# Patient Record
Sex: FEMALE | Race: WHITE | NOT HISPANIC OR LATINO | Employment: FULL TIME | URBAN - NONMETROPOLITAN AREA
[De-identification: names, ages, dates, MRNs, and addresses within clinical notes are randomized per-mention and may not be internally consistent; named-entity substitution may affect disease eponyms.]

---
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
## 2018-01-11 ENCOUNTER — OFFICE VISIT (OUTPATIENT)
Dept: OBSTETRICS AND GYNECOLOGY | Facility: CLINIC | Age: 18
End: 2018-01-11

## 2018-01-11 VITALS
BODY MASS INDEX: 42.81 KG/M2 | DIASTOLIC BLOOD PRESSURE: 62 MMHG | SYSTOLIC BLOOD PRESSURE: 104 MMHG | HEIGHT: 55 IN | WEIGHT: 185 LBS

## 2018-01-11 DIAGNOSIS — E28.2 PCOS (POLYCYSTIC OVARIAN SYNDROME): ICD-10-CM

## 2018-01-11 DIAGNOSIS — Z01.419 GYNECOLOGIC EXAM NORMAL: Primary | ICD-10-CM

## 2018-01-11 PROCEDURE — 99395 PREV VISIT EST AGE 18-39: CPT | Performed by: NURSE PRACTITIONER

## 2018-01-11 RX ORDER — LEVONORGESTREL AND ETHINYL ESTRADIOL 0.15-0.03
1 KIT ORAL DAILY
Qty: 28 TABLET | Refills: 12 | Status: SHIPPED | OUTPATIENT
Start: 2018-01-11 | End: 2019-01-11

## 2018-01-11 RX ORDER — METFORMIN HYDROCHLORIDE 500 MG/1
500 TABLET, EXTENDED RELEASE ORAL
Qty: 60 TABLET | Refills: 6 | Status: ON HOLD | OUTPATIENT
Start: 2018-01-11 | End: 2022-01-01

## 2018-01-11 NOTE — PATIENT INSTRUCTIONS
Pt. Counseled today on safe sex practices, pap smear performed, self breast examinations discussed, if positive family history mammogram starting at age 35 otherwise starting at age 40. Diet and exercise also counseled.

## 2018-01-11 NOTE — PROGRESS NOTES
Ivis Madrid is a 17 y.o. female.   Chief Complaint   Patient presents with   • Gynecologic Exam     Patient is here for a annual.      HPI:pt here for gyn exam , wants to resary birth control pills    The following portions of the patient's history were reviewed and updated as appropriate: allergies, current medications, past family history, past medical history, past social history, past surgical history and problem list.    Review of Systems  Review of Systems   Constitutional: Negative.  Negative for unexpected weight change.   Respiratory: Negative for chest tightness and shortness of breath.    Cardiovascular: Negative for chest pain and palpitations.   Gastrointestinal: Negative for abdominal pain and blood in stool.   Endocrine: Negative.    Genitourinary: Negative for dyspareunia, dysuria, frequency, hematuria, menstrual problem, pelvic pain, vaginal bleeding, vaginal discharge and vaginal pain.   Musculoskeletal: Negative for joint swelling.   Skin: Negative for color change, rash and wound.   Allergic/Immunologic: Negative.    Psychiatric/Behavioral: Negative.    All other systems reviewed and are negative.      Objective   Physical Exam   Constitutional: She is oriented to person, place, and time. She appears well-developed and well-nourished.   HENT:   Head: Normocephalic.   Neck: Normal range of motion.   Cardiovascular: Normal rate and regular rhythm.    Pulmonary/Chest: Effort normal and breath sounds normal. Right breast exhibits no mass and no nipple discharge. Left breast exhibits no mass and no nipple discharge. There is no breast swelling.   Breasts soft without palpable masses   Abdominal: Soft. Bowel sounds are normal.   Genitourinary: Vagina normal and uterus normal. There is no rash or lesion on the right labia. There is no rash or lesion on the left labia. Cervix exhibits no friability. Right adnexum displays no mass, no tenderness and no fullness. Left adnexum displays no mass, no  tenderness and no fullness.   Genitourinary Comments: Ovaries  Within normal limits. Cervix  Within normal limits   Neurological: She is alert and oriented to person, place, and time.   Skin: Skin is warm and dry.   Psychiatric: She has a normal mood and affect. Her behavior is normal.   Vitals reviewed.      Assessment/Plan   Patient Instructions   Pt. Counseled today on safe sex practices, pap smear performed, self breast examinations discussed, if positive family history mammogram starting at age 35 otherwise starting at age 40. Diet and exercise also counseled.       Ivis was seen today for gynecologic exam.    Diagnoses and all orders for this visit:    Gynecologic exam normal  -     Pap IG, Rfx HPV ASCU - ThinPrep Vial, Cervix    PCOS (polycystic ovarian syndrome)  -     Comprehensive Metabolic Panel  -     Hemoglobin A1c    Other orders  -     levonorgestrel-ethinyl estradiol (NORDETTE) 0.15-30 MG-MCG per tablet; Take 1 tablet by mouth Daily.  -     metFORMIN ER (GLUCOPHAGE-XR) 500 MG 24 hr tablet; Take 1 tablet by mouth Daily With Breakfast & Dinner.        Return in about 1 year (around 1/11/2019).

## 2018-01-12 LAB
ALBUMIN SERPL-MCNC: 4.8 G/DL (ref 3.5–5.2)
ALBUMIN/GLOB SERPL: 1.7 G/DL
ALP SERPL-CCNC: 72 U/L (ref 39–117)
ALT SERPL-CCNC: 20 U/L (ref 1–33)
AST SERPL-CCNC: 16 U/L (ref 1–32)
BILIRUB SERPL-MCNC: 0.3 MG/DL (ref 0.1–1.2)
BUN SERPL-MCNC: 9 MG/DL (ref 6–20)
BUN/CREAT SERPL: 12.2 (ref 7–25)
CALCIUM SERPL-MCNC: 10.2 MG/DL (ref 8.6–10.5)
CHLORIDE SERPL-SCNC: 104 MMOL/L (ref 98–107)
CO2 SERPL-SCNC: 27.2 MMOL/L (ref 22–29)
CREAT SERPL-MCNC: 0.74 MG/DL (ref 0.57–1)
EST. AVERAGE GLUCOSE BLD GHB EST-MCNC: 108.28 MG/DL
GLOBULIN SER CALC-MCNC: 2.9 GM/DL
GLUCOSE SERPL-MCNC: 84 MG/DL (ref 65–99)
HBA1C MFR BLD: 5.4 % (ref 4.8–5.6)
POTASSIUM SERPL-SCNC: 5.5 MMOL/L (ref 3.5–5.2)
PROT SERPL-MCNC: 7.7 G/DL (ref 6–8.5)
SODIUM SERPL-SCNC: 144 MMOL/L (ref 136–145)

## 2018-01-15 LAB
CONV .: NORMAL
CYTOLOGIST CVX/VAG CYTO: NORMAL
CYTOLOGY CVX/VAG DOC THIN PREP: NORMAL
DX ICD CODE: NORMAL
HIV 1 & 2 AB SER-IMP: NORMAL
OTHER STN SPEC: NORMAL
PATH REPORT.FINAL DX SPEC: NORMAL
STAT OF ADQ CVX/VAG CYTO-IMP: NORMAL

## 2018-01-25 ENCOUNTER — TELEPHONE (OUTPATIENT)
Dept: OBSTETRICS AND GYNECOLOGY | Facility: CLINIC | Age: 18
End: 2018-01-25

## 2019-02-26 RX ORDER — LEVONORGESTREL AND ETHINYL ESTRADIOL 0.15-0.03
KIT ORAL
Qty: 28 TABLET | Refills: 3 | Status: ON HOLD | OUTPATIENT
Start: 2019-02-26 | End: 2022-01-02

## 2021-04-16 ENCOUNTER — BULK ORDERING (OUTPATIENT)
Dept: CASE MANAGEMENT | Facility: OTHER | Age: 21
End: 2021-04-16

## 2021-04-16 DIAGNOSIS — Z23 IMMUNIZATION DUE: ICD-10-CM

## 2021-10-19 ENCOUNTER — OFFICE VISIT (OUTPATIENT)
Dept: GASTROENTEROLOGY | Facility: CLINIC | Age: 21
End: 2021-10-19

## 2021-10-19 VITALS
SYSTOLIC BLOOD PRESSURE: 120 MMHG | TEMPERATURE: 97.3 F | HEART RATE: 72 BPM | BODY MASS INDEX: 39.76 KG/M2 | DIASTOLIC BLOOD PRESSURE: 74 MMHG | HEIGHT: 63 IN | WEIGHT: 224.4 LBS

## 2021-10-19 DIAGNOSIS — R11.10 VOMITING, INTRACTABILITY OF VOMITING NOT SPECIFIED, PRESENCE OF NAUSEA NOT SPECIFIED, UNSPECIFIED VOMITING TYPE: Primary | ICD-10-CM

## 2021-10-19 DIAGNOSIS — R10.9 ABDOMINAL PAIN, UNSPECIFIED ABDOMINAL LOCATION: ICD-10-CM

## 2021-10-19 PROCEDURE — 99204 OFFICE O/P NEW MOD 45 MIN: CPT | Performed by: NURSE PRACTITIONER

## 2021-10-19 RX ORDER — SERTRALINE HYDROCHLORIDE 100 MG/1
TABLET, FILM COATED ORAL
Status: ON HOLD | COMMUNITY
Start: 2021-07-28 | End: 2022-01-01

## 2021-10-19 RX ORDER — ONDANSETRON 4 MG/1
TABLET, ORALLY DISINTEGRATING ORAL
Qty: 60 TABLET | Refills: 1 | Status: SHIPPED | OUTPATIENT
Start: 2021-10-19 | End: 2022-01-18

## 2021-10-19 RX ORDER — OMEPRAZOLE 20 MG/1
20 CAPSULE, DELAYED RELEASE ORAL DAILY
COMMUNITY

## 2021-10-19 RX ORDER — FEXOFENADINE HYDROCHLORIDE 60 MG/1
60 TABLET, FILM COATED ORAL DAILY
COMMUNITY

## 2021-10-19 NOTE — PROGRESS NOTES
"Chief Complaint   Patient presents with   • Abdominal Pain   • Nausea         History of Present Illness  21-year-old female presents the office today for evaluation of abdominal pain and nausea.  She reports symptoms started approximately 4 months ago.  She can go 2 weeks between episodes.  She states that symptoms always occur a few hours after she eats and she will experience a pain in her right upper quadrant that radiates into her shoulder blades as well as a general bandlike fashion of pain in her upper abdomen.  Symptoms generally occur after her evening meal.  The pain is constant and feels like a tightening, and then eventually goes away.  She has not noticed any specific foods that symptoms coincide with.  She reports accompanying nausea with these episodes and at times will force herself to vomit in an effort to alleviate symptoms.  She has never had emesis on her own, it is always been forced.  Does not currently take any medication for nausea.  She still has her gallbladder.    She reports having regular daily bowel movements and denies having any diarrhea, constipation, melena, or hematochezia.  She reports her weight is stable.    Denies any heartburn, reflux, or dysphagia.    Review of Systems   Constitutional: Negative for fatigue, fever and unexpected weight change.   HENT: Negative for trouble swallowing.    Cardiovascular: Negative for chest pain.   Gastrointestinal: Positive for abdominal pain, nausea and vomiting. Negative for abdominal distention, anal bleeding, blood in stool, constipation, diarrhea and rectal pain.      Result Review :       Hemoglobin A1C With EMG (01/11/2018 11:46)  Comprehensive Metabolic Panel (01/11/2018 11:46)  Office Visit with Bailee Damian APRN (01/11/2018)    Vital Signs:   /74   Pulse 72   Temp 97.3 °F (36.3 °C)   Ht 160 cm (63\")   Wt 102 kg (224 lb 6.4 oz)   BMI 39.75 kg/m²     Body mass index is 39.75 kg/m².     Physical Exam  Vitals reviewed. "   Constitutional:       Appearance: Normal appearance.   HENT:      Head: Normocephalic.      Nose: Nose normal.      Mouth/Throat:      Mouth: Mucous membranes are moist.   Eyes:      General: No scleral icterus.     Extraocular Movements: Extraocular movements intact.   Cardiovascular:      Rate and Rhythm: Normal rate and regular rhythm.      Pulses: Normal pulses.      Heart sounds: Normal heart sounds.   Pulmonary:      Effort: Pulmonary effort is normal. No respiratory distress.      Breath sounds: Normal breath sounds.   Abdominal:      General: Abdomen is flat. Bowel sounds are normal. There is no distension.      Palpations: Abdomen is soft. There is no mass.      Tenderness: There is no abdominal tenderness. There is no guarding.   Musculoskeletal:         General: Normal range of motion.      Cervical back: Normal range of motion and neck supple.   Skin:     General: Skin is warm and dry.   Neurological:      General: No focal deficit present.      Mental Status: She is alert and oriented to person, place, and time.   Psychiatric:         Mood and Affect: Mood normal.         Behavior: Behavior normal.         Thought Content: Thought content normal.         Judgment: Judgment normal.       Assessment and Plan    Diagnoses and all orders for this visit:    1. Vomiting, intractability of vomiting not specified, presence of nausea not specified, unspecified vomiting type (Primary)  -     NM HIDA Scan With Pharmacological Intervention; Future  -     US Abdomen Limited; Future    2. Abdominal pain, unspecified abdominal location  -     NM HIDA Scan With Pharmacological Intervention; Future  -     US Abdomen Limited; Future    Other orders  -     ondansetron ODT (ZOFRAN-ODT) 4 MG disintegrating tablet; Dissolve 1 tablet by mouth every 6 hours PRN nausea, vomiting  Dispense: 60 tablet; Refill: 1           Patient Instructions   1.  For further evaluation of right upper quadrant, upper abdominal pain, and nausea  we will proceed with ultrasound and HIDA scan for further evaluation of the gallbladder.    2.  For management of nausea in the meantime, a prescription for Zofran has been sent to your pharmacy.  You may take 1 tablet every 6 hours as needed for symptom management.  Please note that this medication can be constipating.    3.  Additional recommendations pending outcome of imaging tests.     Discussion:    We will first proceed with ultrasound and HIDA scan for further evaluation of the gallbladder, as patient symptoms seem very classic for this etiology due to the radiation of discomfort into her back and symptoms always occurring after eating.  If the HIDA scan and ultrasound are normal, we would plan to proceed with EGD for further evaluation and to consider CT of the abdomen and pelvis for further work-up.  Patient verbalized understand above plan of care and is in agreement.  All questions answered and support provided.     EMR Dragon/Transcription Disclaimer:  This document has been Dictated utilizing Dragon dictation.

## 2021-10-19 NOTE — PATIENT INSTRUCTIONS
1.  For further evaluation of right upper quadrant, upper abdominal pain, and nausea we will proceed with ultrasound and HIDA scan for further evaluation of the gallbladder.    2.  For management of nausea in the meantime, a prescription for Zofran has been sent to your pharmacy.  You may take 1 tablet every 6 hours as needed for symptom management.  Please note that this medication can be constipating.    3.  Additional recommendations pending outcome of imaging tests.

## 2021-12-06 ENCOUNTER — HOSPITAL ENCOUNTER (OUTPATIENT)
Dept: ULTRASOUND IMAGING | Facility: HOSPITAL | Age: 21
Discharge: HOME | End: 2021-12-06
Admitting: NURSE PRACTITIONER

## 2021-12-06 ENCOUNTER — HOSPITAL ENCOUNTER (OUTPATIENT)
Dept: NUCLEAR MEDICINE | Facility: HOSPITAL | Age: 21
Discharge: HOME | End: 2021-12-06

## 2021-12-06 DIAGNOSIS — K80.20 CALCULUS OF GALLBLADDER WITHOUT CHOLECYSTITIS WITHOUT OBSTRUCTION: Primary | ICD-10-CM

## 2021-12-06 DIAGNOSIS — R11.10 VOMITING, INTRACTABILITY OF VOMITING NOT SPECIFIED, PRESENCE OF NAUSEA NOT SPECIFIED, UNSPECIFIED VOMITING TYPE: ICD-10-CM

## 2021-12-06 DIAGNOSIS — R10.9 ABDOMINAL PAIN, UNSPECIFIED ABDOMINAL LOCATION: ICD-10-CM

## 2021-12-06 PROCEDURE — 76705 ECHO EXAM OF ABDOMEN: CPT

## 2021-12-06 PROCEDURE — 78227 HEPATOBIL SYST IMAGE W/DRUG: CPT

## 2021-12-06 PROCEDURE — 0 TECHNETIUM TC 99M MEBROFENIN KIT: Performed by: NURSE PRACTITIONER

## 2021-12-06 PROCEDURE — A9537 TC99M MEBROFENIN: HCPCS | Performed by: NURSE PRACTITIONER

## 2021-12-06 PROCEDURE — 25010000002 SINCALIDE PER 5 MCG: Performed by: NURSE PRACTITIONER

## 2021-12-06 RX ORDER — KIT FOR THE PREPARATION OF TECHNETIUM TC 99M MEBROFENIN 45 MG/10ML
1 INJECTION, POWDER, LYOPHILIZED, FOR SOLUTION INTRAVENOUS
Status: COMPLETED | OUTPATIENT
Start: 2021-12-06 | End: 2021-12-06

## 2021-12-06 RX ADMIN — SINCALIDE 2.05 MCG: 5 INJECTION, POWDER, LYOPHILIZED, FOR SOLUTION INTRAVENOUS at 11:20

## 2021-12-06 RX ADMIN — MEBROFENIN 1 DOSE: 45 INJECTION, POWDER, LYOPHILIZED, FOR SOLUTION INTRAVENOUS at 10:15

## 2021-12-13 ENCOUNTER — OFFICE VISIT (OUTPATIENT)
Dept: SURGERY | Facility: CLINIC | Age: 21
End: 2021-12-13

## 2021-12-13 VITALS — BODY MASS INDEX: 40.93 KG/M2 | HEIGHT: 63 IN | WEIGHT: 231 LBS

## 2021-12-13 DIAGNOSIS — K80.20 CALCULUS OF GALLBLADDER WITHOUT CHOLECYSTITIS WITHOUT OBSTRUCTION: Primary | ICD-10-CM

## 2021-12-13 PROCEDURE — 99203 OFFICE O/P NEW LOW 30 MIN: CPT | Performed by: SURGERY

## 2021-12-13 NOTE — PROGRESS NOTES
Cc: Nausea, vomiting, abdominal pain    History of presenting illness:   This is a nice, reasonably healthy 21-year-old female with about 6 months of intermittent epigastric abdominal pain with some radiation into her chest and back.  There are no obvious triggering events.  It tends to last several hours at a time.  It is associated with nausea and occasionally with vomiting.  She has had a work-up with GI and this included an ultrasound demonstrating stones and a HIDA scan which was largely unrevealing.    Past Medical History: Polycystic ovarian disease, acid reflux    Past Surgical History: Negative outside of some knee surgery as a child    Medications: Zoloft, Metformin, oral birth control, omeprazole, Allegra as needed    Allergies: Sulfa drugs    Social History: Non-smoker, she is active    Family History: Gallbladder disease in several family members on her mother side, negative for colorectal cancer    Review of Systems:  Constitutional: Negative for fever, chills, change in weight  Neck: no swollen glands or dysphagia or odynophagia  Respiratory: negative for SOB, cough, hemoptysis or wheezing  Cardiovascular: negative for chest pain, palpitations or peripheral edema  Gastrointestinal: Positive for intermittent nausea, vomiting, abdominal pain      Physical Exam:  BMI: 40.9  General: alert and oriented, appropriate, no acute distress  Eyes: No scleral icterus, extraocular movements are intact  Neck: Supple without lymphadenopathy or thyromegaly, trachea is in the midline  Respiratory: There is good bilateral chest expansion, no use of accessory muscles is noted  Cardiovascular: No jugular venous distention or peripheral edema is seen  Gastrointestinal: Soft and benign, no mass or hernia felt    Laboratory data: No recent relevant data    Imaging data: Gallbladder ultrasound demonstrates stones without other complicating feature.  HIDA scan largely unremarkable with ejection fraction of around  75%      Assessment and plan:   -Symptomatic cholelithiasis.  I have recommended proceeding with laparoscopic cholecystectomy and cholangiogram.    Risks associated with the procedure are noted to include, but not be limited to, bleeding, infection, injury to intra-abdominal structures including the liver, small and large intestine, stomach, duodenum, common bile duct and major vascular structures.  Possible need for conversion to open surgery, further revisional surgery, postoperative ERCP discussed.      Jef Hightower MD, FACS  General, Minimally Invasive and Endoscopic Surgery  [facility]    [address] [place] [address], [address]  [place], [place], [address]  P: [phone]  F: [phone]

## 2022-01-01 ENCOUNTER — HOSPITAL ENCOUNTER (OUTPATIENT)
Facility: HOSPITAL | Age: 22
Discharge: HOME | End: 2022-01-04
Attending: EMERGENCY MEDICINE | Admitting: SURGERY

## 2022-01-01 DIAGNOSIS — K80.20 CALCULUS OF GALLBLADDER WITHOUT CHOLECYSTITIS WITHOUT OBSTRUCTION: ICD-10-CM

## 2022-01-01 DIAGNOSIS — K80.65 CALCULUS OF GALLBLADDER AND BILE DUCT WITH CHRONIC CHOLECYSTITIS WITH OBSTRUCTION: ICD-10-CM

## 2022-01-01 DIAGNOSIS — R79.89 ELEVATED LFTS: Primary | ICD-10-CM

## 2022-01-01 DIAGNOSIS — K80.51 CALCULUS OF BILE DUCT WITHOUT CHOLECYSTITIS WITH OBSTRUCTION: ICD-10-CM

## 2022-01-01 LAB
ALBUMIN SERPL-MCNC: 4.9 G/DL (ref 3.5–5.2)
ALBUMIN/GLOB SERPL: 1.6 G/DL
ALP SERPL-CCNC: 123 U/L (ref 39–117)
ALT SERPL W P-5'-P-CCNC: 193 U/L (ref 1–33)
ANION GAP SERPL CALCULATED.3IONS-SCNC: 12 MMOL/L (ref 5–15)
AST SERPL-CCNC: 101 U/L (ref 1–32)
BACTERIA UR QL AUTO: ABNORMAL /HPF
BASOPHILS # BLD AUTO: 0.04 10*3/MM3 (ref 0–0.2)
BASOPHILS NFR BLD AUTO: 0.4 % (ref 0–1.5)
BILIRUB SERPL-MCNC: 2.7 MG/DL (ref 0–1.2)
BILIRUB UR QL STRIP: NEGATIVE
BUN SERPL-MCNC: 8 MG/DL (ref 6–20)
BUN/CREAT SERPL: 11.6 (ref 7–25)
CALCIUM SPEC-SCNC: 9.6 MG/DL (ref 8.6–10.5)
CHLORIDE SERPL-SCNC: 101 MMOL/L (ref 98–107)
CLARITY UR: CLEAR
CO2 SERPL-SCNC: 25 MMOL/L (ref 22–29)
COLOR UR: YELLOW
CREAT SERPL-MCNC: 0.69 MG/DL (ref 0.57–1)
DEPRECATED RDW RBC AUTO: 41.8 FL (ref 37–54)
EOSINOPHIL # BLD AUTO: 0.04 10*3/MM3 (ref 0–0.4)
EOSINOPHIL NFR BLD AUTO: 0.4 % (ref 0.3–6.2)
ERYTHROCYTE [DISTWIDTH] IN BLOOD BY AUTOMATED COUNT: 12.9 % (ref 12.3–15.4)
GFR SERPL CREATININE-BSD FRML MDRD: 105 ML/MIN/1.73
GLOBULIN UR ELPH-MCNC: 3 GM/DL
GLUCOSE SERPL-MCNC: 82 MG/DL (ref 65–99)
GLUCOSE UR STRIP-MCNC: NEGATIVE MG/DL
HCG SERPL QL: NEGATIVE
HCT VFR BLD AUTO: 39.4 % (ref 34–46.6)
HGB BLD-MCNC: 13.2 G/DL (ref 12–15.9)
HGB UR QL STRIP.AUTO: NEGATIVE
HYALINE CASTS UR QL AUTO: ABNORMAL /LPF
IMM GRANULOCYTES # BLD AUTO: 0.03 10*3/MM3 (ref 0–0.05)
IMM GRANULOCYTES NFR BLD AUTO: 0.3 % (ref 0–0.5)
KETONES UR QL STRIP: ABNORMAL
LEUKOCYTE ESTERASE UR QL STRIP.AUTO: ABNORMAL
LIPASE SERPL-CCNC: 48 U/L (ref 13–60)
LYMPHOCYTES # BLD AUTO: 1.6 10*3/MM3 (ref 0.7–3.1)
LYMPHOCYTES NFR BLD AUTO: 17.2 % (ref 19.6–45.3)
MCH RBC QN AUTO: 29.7 PG (ref 26.6–33)
MCHC RBC AUTO-ENTMCNC: 33.5 G/DL (ref 31.5–35.7)
MCV RBC AUTO: 88.5 FL (ref 79–97)
MONOCYTES # BLD AUTO: 0.56 10*3/MM3 (ref 0.1–0.9)
MONOCYTES NFR BLD AUTO: 6 % (ref 5–12)
NEUTROPHILS NFR BLD AUTO: 7.03 10*3/MM3 (ref 1.7–7)
NEUTROPHILS NFR BLD AUTO: 75.7 % (ref 42.7–76)
NITRITE UR QL STRIP: NEGATIVE
NRBC BLD AUTO-RTO: 0 /100 WBC (ref 0–0.2)
PH UR STRIP.AUTO: 5.5 [PH] (ref 5–8)
PLATELET # BLD AUTO: 307 10*3/MM3 (ref 140–450)
PMV BLD AUTO: 11.4 FL (ref 6–12)
POTASSIUM SERPL-SCNC: 3.9 MMOL/L (ref 3.5–5.2)
PROT SERPL-MCNC: 7.9 G/DL (ref 6–8.5)
PROT UR QL STRIP: NEGATIVE
RBC # BLD AUTO: 4.45 10*6/MM3 (ref 3.77–5.28)
RBC # UR STRIP: ABNORMAL /HPF
REF LAB TEST METHOD: ABNORMAL
SARS-COV-2 RNA RESP QL NAA+PROBE: NOT DETECTED
SODIUM SERPL-SCNC: 138 MMOL/L (ref 136–145)
SP GR UR STRIP: 1.01 (ref 1–1.03)
SQUAMOUS #/AREA URNS HPF: ABNORMAL /HPF
UROBILINOGEN UR QL STRIP: ABNORMAL
WBC # UR STRIP: ABNORMAL /HPF
WBC NRBC COR # BLD: 9.3 10*3/MM3 (ref 3.4–10.8)

## 2022-01-01 PROCEDURE — 25010000002 HYDROMORPHONE 1 MG/ML SOLUTION: Performed by: EMERGENCY MEDICINE

## 2022-01-01 PROCEDURE — 81001 URINALYSIS AUTO W/SCOPE: CPT | Performed by: PHYSICIAN ASSISTANT

## 2022-01-01 PROCEDURE — 96375 TX/PRO/DX INJ NEW DRUG ADDON: CPT

## 2022-01-01 PROCEDURE — C9803 HOPD COVID-19 SPEC COLLECT: HCPCS

## 2022-01-01 PROCEDURE — 80053 COMPREHEN METABOLIC PANEL: CPT | Performed by: PHYSICIAN ASSISTANT

## 2022-01-01 PROCEDURE — G0378 HOSPITAL OBSERVATION PER HR: HCPCS

## 2022-01-01 PROCEDURE — 25010000002 ONDANSETRON PER 1 MG: Performed by: EMERGENCY MEDICINE

## 2022-01-01 PROCEDURE — 96374 THER/PROPH/DIAG INJ IV PUSH: CPT

## 2022-01-01 PROCEDURE — 83690 ASSAY OF LIPASE: CPT | Performed by: PHYSICIAN ASSISTANT

## 2022-01-01 PROCEDURE — 99218 PR INITIAL OBSERVATION CARE/DAY 30 MINUTES: CPT | Performed by: SURGERY

## 2022-01-01 PROCEDURE — 99284 EMERGENCY DEPT VISIT MOD MDM: CPT

## 2022-01-01 PROCEDURE — U0003 INFECTIOUS AGENT DETECTION BY NUCLEIC ACID (DNA OR RNA); SEVERE ACUTE RESPIRATORY SYNDROME CORONAVIRUS 2 (SARS-COV-2) (CORONAVIRUS DISEASE [COVID-19]), AMPLIFIED PROBE TECHNIQUE, MAKING USE OF HIGH THROUGHPUT TECHNOLOGIES AS DESCRIBED BY CMS-2020-01-R: HCPCS | Performed by: SURGERY

## 2022-01-01 PROCEDURE — 84703 CHORIONIC GONADOTROPIN ASSAY: CPT | Performed by: PHYSICIAN ASSISTANT

## 2022-01-01 PROCEDURE — 96361 HYDRATE IV INFUSION ADD-ON: CPT

## 2022-01-01 PROCEDURE — 85025 COMPLETE CBC W/AUTO DIFF WBC: CPT | Performed by: PHYSICIAN ASSISTANT

## 2022-01-01 RX ORDER — SERTRALINE HYDROCHLORIDE 100 MG/1
100 TABLET, FILM COATED ORAL DAILY
Status: DISCONTINUED | OUTPATIENT
Start: 2022-01-01 | End: 2022-01-02

## 2022-01-01 RX ORDER — NALOXONE HCL 0.4 MG/ML
0.4 VIAL (ML) INJECTION
Status: DISCONTINUED | OUTPATIENT
Start: 2022-01-01 | End: 2022-01-04 | Stop reason: HOSPADM

## 2022-01-01 RX ORDER — SODIUM CHLORIDE 0.9 % (FLUSH) 0.9 %
10 SYRINGE (ML) INJECTION AS NEEDED
Status: DISCONTINUED | OUTPATIENT
Start: 2022-01-01 | End: 2022-01-04 | Stop reason: HOSPADM

## 2022-01-01 RX ORDER — SODIUM CHLORIDE 0.9 % (FLUSH) 0.9 %
10 SYRINGE (ML) INJECTION EVERY 12 HOURS SCHEDULED
Status: DISCONTINUED | OUTPATIENT
Start: 2022-01-01 | End: 2022-01-04 | Stop reason: HOSPADM

## 2022-01-01 RX ORDER — SODIUM CHLORIDE, SODIUM LACTATE, POTASSIUM CHLORIDE, CALCIUM CHLORIDE 600; 310; 30; 20 MG/100ML; MG/100ML; MG/100ML; MG/100ML
100 INJECTION, SOLUTION INTRAVENOUS CONTINUOUS
Status: DISCONTINUED | OUTPATIENT
Start: 2022-01-01 | End: 2022-01-03

## 2022-01-01 RX ORDER — PANTOPRAZOLE SODIUM 40 MG/1
40 TABLET, DELAYED RELEASE ORAL EVERY MORNING
Status: DISCONTINUED | OUTPATIENT
Start: 2022-01-02 | End: 2022-01-04 | Stop reason: HOSPADM

## 2022-01-01 RX ORDER — SODIUM CHLORIDE 0.9 % (FLUSH) 0.9 %
1-10 SYRINGE (ML) INJECTION AS NEEDED
Status: DISCONTINUED | OUTPATIENT
Start: 2022-01-01 | End: 2022-01-04 | Stop reason: HOSPADM

## 2022-01-01 RX ORDER — MORPHINE SULFATE 2 MG/ML
4 INJECTION, SOLUTION INTRAMUSCULAR; INTRAVENOUS
Status: DISCONTINUED | OUTPATIENT
Start: 2022-01-01 | End: 2022-01-04 | Stop reason: HOSPADM

## 2022-01-01 RX ORDER — OXYCODONE HYDROCHLORIDE AND ACETAMINOPHEN 5; 325 MG/1; MG/1
1 TABLET ORAL EVERY 4 HOURS PRN
Status: DISCONTINUED | OUTPATIENT
Start: 2022-01-01 | End: 2022-01-04 | Stop reason: HOSPADM

## 2022-01-01 RX ORDER — ONDANSETRON 2 MG/ML
4 INJECTION INTRAMUSCULAR; INTRAVENOUS ONCE
Status: COMPLETED | OUTPATIENT
Start: 2022-01-01 | End: 2022-01-01

## 2022-01-01 RX ORDER — ONDANSETRON 2 MG/ML
4 INJECTION INTRAMUSCULAR; INTRAVENOUS EVERY 6 HOURS PRN
Status: DISCONTINUED | OUTPATIENT
Start: 2022-01-01 | End: 2022-01-04 | Stop reason: HOSPADM

## 2022-01-01 RX ORDER — METFORMIN HYDROCHLORIDE 500 MG/1
500 TABLET, EXTENDED RELEASE ORAL
Status: DISCONTINUED | OUTPATIENT
Start: 2022-01-01 | End: 2022-01-02

## 2022-01-01 RX ADMIN — ONDANSETRON 4 MG: 2 INJECTION INTRAMUSCULAR; INTRAVENOUS at 14:03

## 2022-01-01 RX ADMIN — SODIUM CHLORIDE, POTASSIUM CHLORIDE, SODIUM LACTATE AND CALCIUM CHLORIDE 100 ML/HR: 600; 310; 30; 20 INJECTION, SOLUTION INTRAVENOUS at 18:10

## 2022-01-01 RX ADMIN — HYDROMORPHONE HYDROCHLORIDE 1 MG: 1 INJECTION, SOLUTION INTRAMUSCULAR; INTRAVENOUS; SUBCUTANEOUS at 14:05

## 2022-01-01 NOTE — ED NOTES
Nursing report ED to floor  Ivis Madrid  21 y.o.  female    HPI :   Chief Complaint   Patient presents with   • Abdominal Pain   • Back Pain   • Nausea   • Vomiting       Admitting doctor:   Toni Villatoro Jr., MD    Admitting diagnosis:   The primary encounter diagnosis was Elevated LFTs. A diagnosis of Calculus of bile duct without cholecystitis with obstruction was also pertinent to this visit.    Code status:   Current Code Status     Date Active Code Status Order ID Comments User Context       Not on file    Advance Care Planning Activity          Allergies:   Sulfa antibiotics    Intake and Output  No intake or output data in the 24 hours ending 01/01/22 1627    Weight:   There were no vitals filed for this visit.    Most recent vitals:   Vitals:    01/01/22 1501 01/01/22 1519 01/01/22 1601 01/01/22 1620   BP: 114/74  129/72    Pulse:  84  88   Resp:       Temp:       TempSrc:       SpO2:  96%  97%       Active LDAs/IV Access:   Lines, Drains & Airways     Active LDAs     Name Placement date Placement time Site Days    Peripheral IV 01/01/22 1344 Right Antecubital 01/01/22  1344  Antecubital  less than 1                Labs (abnormal labs have a star):   Labs Reviewed   COMPREHENSIVE METABOLIC PANEL - Abnormal; Notable for the following components:       Result Value    ALT (SGPT) 193 (*)     AST (SGOT) 101 (*)     Alkaline Phosphatase 123 (*)     Total Bilirubin 2.7 (*)     All other components within normal limits    Narrative:     GFR Normal >60  Chronic Kidney Disease <60  Kidney Failure <15     URINALYSIS W/ MICROSCOPIC IF INDICATED (NO CULTURE) - Abnormal; Notable for the following components:    Ketones, UA 40 mg/dL (2+) (*)     Leuk Esterase, UA Small (1+) (*)     All other components within normal limits   CBC WITH AUTO DIFFERENTIAL - Abnormal; Notable for the following components:    Lymphocyte % 17.2 (*)     Neutrophils, Absolute 7.03 (*)     All other components within normal limits   URINALYSIS,  MICROSCOPIC ONLY - Abnormal; Notable for the following components:    WBC, UA 3-5 (*)     Bacteria, UA 1+ (*)     Squamous Epithelial Cells, UA 13-20 (*)     All other components within normal limits   LIPASE - Normal   HCG, SERUM, QUALITATIVE - Normal   COVID PRE-OP / PRE-PROCEDURE SCREENING ORDER (NO ISOLATION)    Narrative:     The following orders were created for panel order COVID PRE-OP / PRE-PROCEDURE SCREENING ORDER (NO ISOLATION) - Swab, Nasopharynx.  Procedure                               Abnormality         Status                     ---------                               -----------         ------                     COVID-19, [facility] IN-HOUSE...[[id number]]                      In process                   Please view results for these tests on the individual orders.   COVID-19, [facility] IN-HOUSE CEPHEID/MAAME, NP SWAB IN TRANSPORT MEDIA 8-12 HR TAT   CBC AND DIFFERENTIAL    Narrative:     The following orders were created for panel order CBC & Differential.  Procedure                               Abnormality         Status                     ---------                               -----------         ------                     CBC Auto Differential[[id number]]        Abnormal            Final result                 Please view results for these tests on the individual orders.       EKG:   No orders to display       Meds given in ED:   Medications   sodium chloride 0.9 % flush 10 mL (has no administration in time range)   HYDROmorphone (DILAUDID) injection 1 mg (1 mg Intravenous Given 1/1/22 1405)   ondansetron (ZOFRAN) injection 4 mg (4 mg Intravenous Given 1/1/22 1403)       Imaging results:  No radiology results for the last day    Ambulatory status:   -Up ad joelle    Social issues:   Social History     Socioeconomic History   • Marital status: Single   Tobacco Use   • Smoking status: Never Smoker   • Smokeless tobacco: Never Used   Vaping Use   • Vaping Use: Never used   Substance and Sexual Activity   •  Alcohol use: Yes     Comment: OCC    • Drug use: Yes     Frequency: 0.8 times per week     Types: Marijuana   • Sexual activity: Yes     Partners: Male     Birth control/protection: Pill, Condom       NIH Stroke Scale:0        Nursing report ED to floor:       Bonnie Anthony RN  01/01/22 9255

## 2022-01-01 NOTE — ED PROVIDER NOTES
" EMERGENCY DEPARTMENT ENCOUNTER    Room Number:  07/07  Date seen:  1/1/2022  Time seen: 13:17 EST  PCP: Esequiel Vicente MD  Historian: patient      HPI:  Chief Complaint: abdominal/back pain    Context: Ivis Madrid is a 21 y.o. female who presents to the ED for evaluation of abdominal and back pain for the past couple days.  Patient states she has been having \"gallbladder attacks.\"  She states she is scheduled for cholecystectomy on the 21st of this month by Dr. Madden however has been having intense pain in the past couple days.  She denies any fever or chills.  She has had some nausea but no vomiting.  She denies any diarrhea or other changes in her bowels.  She states her LMP was approximately a month and a half ago although states her periods are irregular due to PCOS.  Denies any other history of abdominal surgeries.  States her appetite has been limited due to pain.  She states her pain is an 8 out of 10 and waxes and wanes.      PAST MEDICAL HISTORY  Active Ambulatory Problems     Diagnosis Date Noted   • No Active Ambulatory Problems     Resolved Ambulatory Problems     Diagnosis Date Noted   • Calculus of gallbladder without cholecystitis without obstruction 12/13/2021     Past Medical History:   Diagnosis Date   • Anxiety    • Heart burn    • PCOS (polycystic ovarian syndrome)          PAST SURGICAL HISTORY  No past surgical history on file.      FAMILY HISTORY  Family History   Problem Relation Age of Onset   • Diabetes Father    • Arthritis Father    • Arthritis Mother    • Asthma Mother    • Heart disease Paternal Grandfather    • Colon cancer Neg Hx    • Colon polyps Neg Hx    • Crohn's disease Neg Hx    • Irritable bowel syndrome Neg Hx    • Ulcerative colitis Neg Hx          SOCIAL HISTORY  Social History     Socioeconomic History   • Marital status: Single   Tobacco Use   • Smoking status: Never Smoker   • Smokeless tobacco: Never Used   Vaping Use   • Vaping Use: Never used   Substance and " Sexual Activity   • Alcohol use: Yes     Comment: OCC    • Drug use: Yes     Frequency: 0.8 times per week     Types: Marijuana   • Sexual activity: Yes     Partners: Male     Birth control/protection: Pill, Condom         ALLERGIES  Sulfa antibiotics        REVIEW OF SYSTEMS  Review of Systems   Constitutional: Negative for chills and fever.   Respiratory: Negative for cough and shortness of breath.    Cardiovascular: Negative for chest pain.   Gastrointestinal: Positive for abdominal pain and nausea. Negative for vomiting.   Genitourinary: Negative for dysuria and hematuria.   Musculoskeletal: Positive for back pain.   Skin: Negative for color change.        All systems reviewed and negative except for those discussed in HPI.       PHYSICAL EXAM  ED Triage Vitals [01/01/22 1243]   Temp Heart Rate Resp BP SpO2   97.1 °F (36.2 °C) 106 16 -- --      Temp src Heart Rate Source Patient Position BP Location FiO2 (%)   Tympanic Monitor -- -- --         GENERAL: Mild distress due to pain  HENT: atraumatic  EYES: no scleral icterus  CV: regular rhythm, regular rate  RESPIRATORY: normal effort  ABDOMEN: soft, tender in the right upper quadrant and epigastric region.  No CVA tenderness.  No rigidity or guarding.  Normal bowel sounds.  MUSCULOSKELETAL: no deformity  NEURO: alert, moves all extremities, follows commands  SKIN: warm, dry    Vital signs and nursing notes reviewed.          LAB RESULTS  Recent Results (from the past 24 hour(s))   Urinalysis With Microscopic If Indicated (No Culture) - Urine, Clean Catch    Collection Time: 01/01/22  1:23 PM    Specimen: Urine, Clean Catch   Result Value Ref Range    Color, UA Yellow Yellow, Straw    Appearance, UA Clear Clear    pH, UA 5.5 5.0 - 8.0    Specific Gravity, UA 1.007 1.005 - 1.030    Glucose, UA Negative Negative    Ketones, UA 40 mg/dL (2+) (A) Negative    Bilirubin, UA Negative Negative    Blood, UA Negative Negative    Protein, UA Negative Negative    Leuk  Esterase, UA Small (1+) (A) Negative    Nitrite, UA Negative Negative    Urobilinogen, UA 0.2 E.U./dL 0.2 - 1.0 E.U./dL   Urinalysis, Microscopic Only - Urine, Clean Catch    Collection Time: 01/01/22  1:23 PM    Specimen: Urine, Clean Catch   Result Value Ref Range    RBC, UA None Seen None Seen, 0-2 /HPF    WBC, UA 3-5 (A) None Seen, 0-2 /HPF    Bacteria, UA 1+ (A) None Seen /HPF    Squamous Epithelial Cells, UA 13-20 (A) None Seen, 0-2 /HPF    Hyaline Casts, UA None Seen None Seen /LPF    Methodology Manual Light Microscopy    Comprehensive Metabolic Panel    Collection Time: 01/01/22  1:43 PM    Specimen: Blood   Result Value Ref Range    Glucose 82 65 - 99 mg/dL    BUN 8 6 - 20 mg/dL    Creatinine 0.69 0.57 - 1.00 mg/dL    Sodium 138 136 - 145 mmol/L    Potassium 3.9 3.5 - 5.2 mmol/L    Chloride 101 98 - 107 mmol/L    CO2 25.0 22.0 - 29.0 mmol/L    Calcium 9.6 8.6 - 10.5 mg/dL    Total Protein 7.9 6.0 - 8.5 g/dL    Albumin 4.90 3.50 - 5.20 g/dL    ALT (SGPT) 193 (H) 1 - 33 U/L    AST (SGOT) 101 (H) 1 - 32 U/L    Alkaline Phosphatase 123 (H) 39 - 117 U/L    Total Bilirubin 2.7 (H) 0.0 - 1.2 mg/dL    eGFR Non African Amer 105 >60 mL/min/1.73    Globulin 3.0 gm/dL    A/G Ratio 1.6 g/dL    BUN/Creatinine Ratio 11.6 7.0 - 25.0    Anion Gap 12.0 5.0 - 15.0 mmol/L   Lipase    Collection Time: 01/01/22  1:43 PM    Specimen: Blood   Result Value Ref Range    Lipase 48 13 - 60 U/L   hCG, Serum, Qualitative    Collection Time: 01/01/22  1:43 PM    Specimen: Blood   Result Value Ref Range    HCG Qualitative Negative Negative   CBC Auto Differential    Collection Time: 01/01/22  1:43 PM    Specimen: Blood   Result Value Ref Range    WBC 9.30 3.40 - 10.80 10*3/mm3    RBC 4.45 3.77 - 5.28 10*6/mm3    Hemoglobin 13.2 12.0 - 15.9 g/dL    Hematocrit 39.4 34.0 - 46.6 %    MCV 88.5 79.0 - 97.0 fL    MCH 29.7 26.6 - 33.0 pg    MCHC 33.5 31.5 - 35.7 g/dL    RDW 12.9 12.3 - 15.4 %    RDW-SD 41.8 37.0 - 54.0 fl    MPV 11.4 6.0 - 12.0  fL    Platelets 307 140 - 450 10*3/mm3    Neutrophil % 75.7 42.7 - 76.0 %    Lymphocyte % 17.2 (L) 19.6 - 45.3 %    Monocyte % 6.0 5.0 - 12.0 %    Eosinophil % 0.4 0.3 - 6.2 %    Basophil % 0.4 0.0 - 1.5 %    Immature Grans % 0.3 0.0 - 0.5 %    Neutrophils, Absolute 7.03 (H) 1.70 - 7.00 10*3/mm3    Lymphocytes, Absolute 1.60 0.70 - 3.10 10*3/mm3    Monocytes, Absolute 0.56 0.10 - 0.90 10*3/mm3    Eosinophils, Absolute 0.04 0.00 - 0.40 10*3/mm3    Basophils, Absolute 0.04 0.00 - 0.20 10*3/mm3    Immature Grans, Absolute 0.03 0.00 - 0.05 10*3/mm3    nRBC 0.0 0.0 - 0.2 /100 WBC       Ordered the above labs and independently reviewed the results.        RADIOLOGY  NM HIDA Scan With Pharmacological Intervention    Result Date: 12/6/2021  Narrative: NUCLEAR MEDICINE HIDA SCAN WITH PHARMACOLOGIC INTERVENTION  HISTORY: Right upper quadrant abdominal pain with nausea and vomiting..  TECHNIQUE: HIDA scan with gallbladder functional evaluation was performed using 4.6 mCi technetium Choletec and 2.05 mcg of cholecystokinin.   CORRELATION: Right upper quadrant sonogram performed today and reported separately.  FINDINGS: The anterior 30 and 60 minute images show normal activity in the gallbladder, liver, biliary tree, and bowel.  Gallbladder ejection fraction measures 75%.  Normal range is considered 35% or greater.      Impression: Negative.  This report was finalized on 12/6/2021 3:25 PM by Dr. Miguelangel Belle M.D.      US Abdomen Limited    Result Date: 12/6/2021  Narrative: RIGHT UPPER QUADRANT SONOGRAM  HISTORY: Right upper quadrant abdominal pain and vomiting.  TECHNIQUE: Sonogram of the right upper quadrant is provided. Nuclear medicine HIDA scan was performed today and is reported separately.  FINDINGS: There are multiple gallstones. There is no gallbladder wall thickening. The common bile duct measures 3 mm. There is no sonographic Cueva's sign.  Visualized portions of the pancreas, liver, and right kidney appear  normal. The kidney measures 10.7 x 4.2 x 5.2 cm.      Impression: Cholelithiasis without sonographic evidence of obstruction or cholecystitis.  This report was finalized on 12/6/2021 3:25 PM by Dr. Miguelangel Belle M.D.        I ordered the above noted radiological studies. Reviewed by me and discussed with radiologist.  See dictation for official radiology interpretation.      MEDICATIONS GIVEN IN ER  Medications   sodium chloride 0.9 % flush 10 mL (has no administration in time range)   HYDROmorphone (DILAUDID) injection 1 mg (1 mg Intravenous Given 1/1/22 1405)   ondansetron (ZOFRAN) injection 4 mg (4 mg Intravenous Given 1/1/22 1403)       MEDICAL RECORD REVIEW  I reviewed the patient's records      PROGRESS, DATA ANALYSIS, CONSULTS, AND MEDICAL DECISION MAKING    All labs have been independently reviewed by me.  All radiology studies have been reviewed by me. Discussion below represents my analysis of pertinent findings related to patient's condition, differential diagnosis, treatment plan and final disposition.    DDX includes but not limited to cholelithiasis, cholecystitis, gastritis.       ED Course as of 01/01/22 1558   Sat Jan 01, 2022   1432 Patient's LFTs are elevated.  White count is normal at this time.  Will page whoever is on-call for Dr. Hightower. [AH]   1523 Discussed the patient with Dr. Villatoro who is on-call for Dr. Madden.  He recommends to bring this patient in for admission at this time.  As she likely has a common duct stone they will have to keep her to further evaluate and see if her bilirubin comes down for performing a cholecystectomy. [AH]   1530 Informed the patient of lab findings and need for admission.  I informed patient she is admitted to Dr. Villatoro with general surgery and they will further evaluate at this time.  Patient's pain has improved after pain medication. [AH]      ED Course User Index  [AH] Carito Zamarripa PA           Reviewed pt's history and workup with Dr. Muñoz.   After a bedside evaluation; they agree with the plan of care      Patient's disposition is admission.    Patient was placed in face mask in first look. Patient was wearing facemask each time I entered the room and throughout our encounter. I wore full protective equipment throughout this patient encounter including a face mask, eye shield, gown and gloves. Hand hygiene was performed before donning protective equipment and after removal when leaving the room.        DIAGNOSIS  Final diagnoses:   Elevated LFTs   Calculus of bile duct without cholecystitis with obstruction           Latest Documented Vital Signs:  As of 15:58 EST  BP- 114/74 HR- 84 Temp- 97.1 °F (36.2 °C) (Tympanic) O2 sat- 96%         Carito Zamarripa, PA  01/01/22 1553

## 2022-01-01 NOTE — ED TRIAGE NOTES
All triage performed with this RN wearing appropriate PPE.  Pt placed in mask upon arrival to ED.  Patient c/o CP/back pain for 3 days. She has a sx scheduled 1/21/22 but reports she can't wait.

## 2022-01-01 NOTE — ED NOTES
Unsuccessful IV sticks x2. Second nurse notified for ultrasound attempt. Pt tolerated well.      Bonnie Anthony, RN  01/01/22 8522

## 2022-01-01 NOTE — H&P
[facility]   HISTORY AND PHYSICAL    Patient Name: Ivis Madrid  : 2000  MRN: [id number]  Primary Care Physician:  Esequiel Vicente MD  Date of admission: 2022    Subjective   Subjective     Chief Complaint: Cholelithiasis with cholecystitis and elevated LFTs    History of Present Illness    The patient is a pleasant 21-year-old female who has known cholelithiasis.  She was seen by Dr. Hightower in the office on 2021 with a 6-month history of intermittent epigastric abdominal pain with some radiation to her chest and back.  A right upper quadrant ultrasound showed gallstones and she was diagnosed with symptomatic cholelithiasis with plans for a laparoscopic cholecystectomy with intraoperative cholangiogram later this month.  She presented to the emergency room with a 4 or 5-day history of worsening of her symptoms.  She is having constant pain that is worse with eating.  She also has nausea and vomiting.  In the emergency room she was noted to have an elevation of her liver function tests with a total bilirubin of 2.7.    Review of Systems   Constitutional: Negative for fatigue and fever.   Respiratory: Negative for chest tightness and shortness of breath.    Cardiovascular: Negative for chest pain and palpitations.   Gastrointestinal: Positive for abdominal pain, nausea and vomiting. Negative for blood in stool, constipation and diarrhea.        Personal History     Past Medical History:   Diagnosis Date   • Anxiety    • Heart burn    • PCOS (polycystic ovarian syndrome)        No past surgical history on file.    Family History: family history includes Arthritis in her father and mother; Asthma in her mother; Diabetes in her father; Heart disease in her paternal grandfather. Otherwise pertinent FHx was reviewed and not pertinent to current issue.    Social History:  reports that she has never smoked. She has never used smokeless tobacco. She reports current alcohol use. She reports current drug  use. Frequency: 0.80 times per week. Drug: Marijuana.    Home Medications:  Norethin-Eth Estrad-Fe Biphas, fexofenadine, levonorgestrel-ethinyl estradiol, metFORMIN ER, omeprazole, ondansetron ODT, and sertraline    Allergies:  Allergies   Allergen Reactions   • Sulfa Antibiotics Hives       Objective    Objective     Vitals:   Temp:  [97.1 °F (36.2 °C)] 97.1 °F (36.2 °C)  Heart Rate:  [] 84  Resp:  [16] 16  BP: (101-136)/(73-84) 114/74    Physical Exam  Constitutional:       Appearance: Normal appearance. She is well-developed. She is not toxic-appearing.   Eyes:      General: No scleral icterus.  Pulmonary:      Effort: Pulmonary effort is normal. No respiratory distress.   Abdominal:      Palpations: Abdomen is soft.      Tenderness: There is no abdominal tenderness.   Skin:     General: Skin is warm and dry.   Neurological:      Mental Status: She is alert and oriented to person, place, and time.   Psychiatric:         Behavior: Behavior normal.         Thought Content: Thought content normal.         Judgment: Judgment normal.         Result Review    Result Review:  I have personally reviewed the results from the time of this admission to 1/1/2022 15:54 EST and agree with these findings:  [x]  Laboratory  []  Microbiology  [x]  Radiology  []  EKG/Telemetry   []  Cardiology/Vascular   []  Pathology  [x]  Old records  []  Other:    Assessment/Plan   Assessment / Plan     Brief Patient Summary with assessment and plan:  Ivis Madrid is a 21 y.o. female who has a known history of symptomatic cholelithiasis and now presents for constant pain.  Her liver function test are elevated suggesting choledocholithiasis.  She will be admitted and her liver function test rechecked tomorrow.  If they are normal, we may proceed with a laparoscopic cholecystectomy with intraoperative cholangiogram.  If the LFTs remain abnormal, she may need an ERCP.  Decisions will be based upon her clinical course.    Active Hospital  Problems:  Active Hospital Problems    Diagnosis    • Calculus of gallbladder and bile duct with chronic cholecystitis with obstruction      Toin Villatoro Jr., MD

## 2022-01-01 NOTE — ED PROVIDER NOTES
MD ATTESTATION NOTE    The FLORA and I have discussed this patient's history, physical exam, and treatment plan.  I have reviewed the documentation and personally had a face to face interaction with the patient. I affirm the documentation and agree with the treatment and plan.  The attached note describes my personal findings.      Ivis Madrid is a 21 y.o. female who presents to the ED c/o intermittent right upper quadrant abdominal pain.  She tried eating a couple eggs this morning and caused recurrence of this pain.      On exam:  Right upper quadrant tenderness without rebound or guarding    Labs  Recent Results (from the past 24 hour(s))   Urinalysis With Microscopic If Indicated (No Culture) - Urine, Clean Catch    Collection Time: 01/01/22  1:23 PM    Specimen: Urine, Clean Catch   Result Value Ref Range    Color, UA Yellow Yellow, Straw    Appearance, UA Clear Clear    pH, UA 5.5 5.0 - 8.0    Specific Gravity, UA 1.007 1.005 - 1.030    Glucose, UA Negative Negative    Ketones, UA 40 mg/dL (2+) (A) Negative    Bilirubin, UA Negative Negative    Blood, UA Negative Negative    Protein, UA Negative Negative    Leuk Esterase, UA Small (1+) (A) Negative    Nitrite, UA Negative Negative    Urobilinogen, UA 0.2 E.U./dL 0.2 - 1.0 E.U./dL   hCG, Serum, Qualitative    Collection Time: 01/01/22  1:43 PM    Specimen: Blood   Result Value Ref Range    HCG Qualitative Negative Negative   CBC Auto Differential    Collection Time: 01/01/22  1:43 PM    Specimen: Blood   Result Value Ref Range    WBC 9.30 3.40 - 10.80 10*3/mm3    RBC 4.45 3.77 - 5.28 10*6/mm3    Hemoglobin 13.2 12.0 - 15.9 g/dL    Hematocrit 39.4 34.0 - 46.6 %    MCV 88.5 79.0 - 97.0 fL    MCH 29.7 26.6 - 33.0 pg    MCHC 33.5 31.5 - 35.7 g/dL    RDW 12.9 12.3 - 15.4 %    RDW-SD 41.8 37.0 - 54.0 fl    MPV 11.4 6.0 - 12.0 fL    Platelets 307 140 - 450 10*3/mm3    Neutrophil % 75.7 42.7 - 76.0 %    Lymphocyte % 17.2 (L) 19.6 - 45.3 %    Monocyte % 6.0 5.0 - 12.0 %     Eosinophil % 0.4 0.3 - 6.2 %    Basophil % 0.4 0.0 - 1.5 %    Immature Grans % 0.3 0.0 - 0.5 %    Neutrophils, Absolute 7.03 (H) 1.70 - 7.00 10*3/mm3    Lymphocytes, Absolute 1.60 0.70 - 3.10 10*3/mm3    Monocytes, Absolute 0.56 0.10 - 0.90 10*3/mm3    Eosinophils, Absolute 0.04 0.00 - 0.40 10*3/mm3    Basophils, Absolute 0.04 0.00 - 0.20 10*3/mm3    Immature Grans, Absolute 0.03 0.00 - 0.05 10*3/mm3    nRBC 0.0 0.0 - 0.2 /100 WBC       Radiology  No Radiology Exams Resulted Within Past 24 Hours    Medical Decision Making:  ED Course as of 01/02/22 7989   Sat Jan 01, 2022   1432 Patient's LFTs are elevated.  White count is normal at this time.  Will page whoever is on-call for Dr. Hightower. [AH]   1523 Discussed the patient with Dr. Villatoro who is on-call for Dr. Madden.  He recommends to bring this patient in for admission at this time.  As she likely has a common duct stone they will have to keep her to further evaluate and see if her bilirubin comes down for performing a cholecystectomy. [AH]   1530 Informed the patient of lab findings and need for admission.  I informed patient she is admitted to Dr. Villatoro with general surgery and they will further evaluate at this time.  Patient's pain has improved after pain medication. [AH]      ED Course User Index  [AH] Carito Zamarripa PA           PPE: Both the patient and I wore a surgical mask throughout the entire patient encounter. I wore protective goggles.     Diagnosis  Final diagnoses:   Elevated LFTs   Calculus of bile duct without cholecystitis with obstruction   Calculus of gallbladder without cholecystitis without obstruction        Rohit Muñoz II, MD  01/02/22 3752

## 2022-01-02 ENCOUNTER — ANESTHESIA EVENT (OUTPATIENT)
Dept: PERIOP | Facility: HOSPITAL | Age: 22
End: 2022-01-02

## 2022-01-02 ENCOUNTER — APPOINTMENT (OUTPATIENT)
Dept: GENERAL RADIOLOGY | Facility: HOSPITAL | Age: 22
End: 2022-01-02

## 2022-01-02 ENCOUNTER — ANESTHESIA (OUTPATIENT)
Dept: PERIOP | Facility: HOSPITAL | Age: 22
End: 2022-01-02

## 2022-01-02 LAB
ALBUMIN SERPL-MCNC: 4.5 G/DL (ref 3.5–5.2)
ALBUMIN/GLOB SERPL: 1.8 G/DL
ALP SERPL-CCNC: 116 U/L (ref 39–117)
ALT SERPL W P-5'-P-CCNC: 163 U/L (ref 1–33)
ANION GAP SERPL CALCULATED.3IONS-SCNC: 11 MMOL/L (ref 5–15)
AST SERPL-CCNC: 88 U/L (ref 1–32)
BASOPHILS # BLD AUTO: 0.05 10*3/MM3 (ref 0–0.2)
BASOPHILS NFR BLD AUTO: 0.7 % (ref 0–1.5)
BILIRUB SERPL-MCNC: 1.5 MG/DL (ref 0–1.2)
BUN SERPL-MCNC: 6 MG/DL (ref 6–20)
BUN/CREAT SERPL: 8.8 (ref 7–25)
CALCIUM SPEC-SCNC: 9.4 MG/DL (ref 8.6–10.5)
CHLORIDE SERPL-SCNC: 102 MMOL/L (ref 98–107)
CO2 SERPL-SCNC: 27 MMOL/L (ref 22–29)
CREAT SERPL-MCNC: 0.68 MG/DL (ref 0.57–1)
DEPRECATED RDW RBC AUTO: 42.4 FL (ref 37–54)
EOSINOPHIL # BLD AUTO: 0.15 10*3/MM3 (ref 0–0.4)
EOSINOPHIL NFR BLD AUTO: 2.2 % (ref 0.3–6.2)
ERYTHROCYTE [DISTWIDTH] IN BLOOD BY AUTOMATED COUNT: 12.9 % (ref 12.3–15.4)
GFR SERPL CREATININE-BSD FRML MDRD: 107 ML/MIN/1.73
GLOBULIN UR ELPH-MCNC: 2.5 GM/DL
GLUCOSE SERPL-MCNC: 81 MG/DL (ref 65–99)
HCT VFR BLD AUTO: 38.8 % (ref 34–46.6)
HGB BLD-MCNC: 12.6 G/DL (ref 12–15.9)
IMM GRANULOCYTES # BLD AUTO: 0.02 10*3/MM3 (ref 0–0.05)
IMM GRANULOCYTES NFR BLD AUTO: 0.3 % (ref 0–0.5)
LIPASE SERPL-CCNC: 34 U/L (ref 13–60)
LYMPHOCYTES # BLD AUTO: 2.22 10*3/MM3 (ref 0.7–3.1)
LYMPHOCYTES NFR BLD AUTO: 33.1 % (ref 19.6–45.3)
MCH RBC QN AUTO: 29.3 PG (ref 26.6–33)
MCHC RBC AUTO-ENTMCNC: 32.5 G/DL (ref 31.5–35.7)
MCV RBC AUTO: 90.2 FL (ref 79–97)
MONOCYTES # BLD AUTO: 0.65 10*3/MM3 (ref 0.1–0.9)
MONOCYTES NFR BLD AUTO: 9.7 % (ref 5–12)
NEUTROPHILS NFR BLD AUTO: 3.61 10*3/MM3 (ref 1.7–7)
NEUTROPHILS NFR BLD AUTO: 54 % (ref 42.7–76)
NRBC BLD AUTO-RTO: 0 /100 WBC (ref 0–0.2)
PLATELET # BLD AUTO: 278 10*3/MM3 (ref 140–450)
PMV BLD AUTO: 11.4 FL (ref 6–12)
POTASSIUM SERPL-SCNC: 3.9 MMOL/L (ref 3.5–5.2)
PROT SERPL-MCNC: 7 G/DL (ref 6–8.5)
RBC # BLD AUTO: 4.3 10*6/MM3 (ref 3.77–5.28)
SODIUM SERPL-SCNC: 140 MMOL/L (ref 136–145)
WBC NRBC COR # BLD: 6.7 10*3/MM3 (ref 3.4–10.8)

## 2022-01-02 PROCEDURE — 25010000002 PROPOFOL 10 MG/ML EMULSION: Performed by: ANESTHESIOLOGY

## 2022-01-02 PROCEDURE — 83690 ASSAY OF LIPASE: CPT | Performed by: SURGERY

## 2022-01-02 PROCEDURE — 25010000002 HYDROMORPHONE PER 4 MG: Performed by: ANESTHESIOLOGY

## 2022-01-02 PROCEDURE — 25010000002 DEXAMETHASONE PER 1 MG: Performed by: ANESTHESIOLOGY

## 2022-01-02 PROCEDURE — 74300 X-RAY BILE DUCTS/PANCREAS: CPT

## 2022-01-02 PROCEDURE — C1889 IMPLANT/INSERT DEVICE, NOC: HCPCS | Performed by: SURGERY

## 2022-01-02 PROCEDURE — 25010000002 FENTANYL CITRATE (PF) 50 MCG/ML SOLUTION: Performed by: ANESTHESIOLOGY

## 2022-01-02 PROCEDURE — 47563 LAPARO CHOLECYSTECTOMY/GRAPH: CPT

## 2022-01-02 PROCEDURE — G0378 HOSPITAL OBSERVATION PER HR: HCPCS

## 2022-01-02 PROCEDURE — 25010000002 ONDANSETRON PER 1 MG: Performed by: ANESTHESIOLOGY

## 2022-01-02 PROCEDURE — 25010000002 ONDANSETRON PER 1 MG: Performed by: SURGERY

## 2022-01-02 PROCEDURE — 85025 COMPLETE CBC W/AUTO DIFF WBC: CPT | Performed by: SURGERY

## 2022-01-02 PROCEDURE — 25010000002 ONDANSETRON PER 1 MG: Performed by: INTERNAL MEDICINE

## 2022-01-02 PROCEDURE — 80053 COMPREHEN METABOLIC PANEL: CPT | Performed by: SURGERY

## 2022-01-02 PROCEDURE — 25010000002 KETOROLAC TROMETHAMINE PER 15 MG: Performed by: ANESTHESIOLOGY

## 2022-01-02 PROCEDURE — 25010000002 NEOSTIGMINE 10 MG/10ML SOLUTION: Performed by: ANESTHESIOLOGY

## 2022-01-02 PROCEDURE — 25010000002 MIDAZOLAM PER 1 MG: Performed by: ANESTHESIOLOGY

## 2022-01-02 PROCEDURE — 88304 TISSUE EXAM BY PATHOLOGIST: CPT | Performed by: SURGERY

## 2022-01-02 PROCEDURE — 96361 HYDRATE IV INFUSION ADD-ON: CPT

## 2022-01-02 PROCEDURE — 25010000002 MORPHINE PER 10 MG: Performed by: SURGERY

## 2022-01-02 PROCEDURE — 25010000002 MORPHINE PER 10 MG: Performed by: INTERNAL MEDICINE

## 2022-01-02 PROCEDURE — 47563 LAPARO CHOLECYSTECTOMY/GRAPH: CPT | Performed by: SURGERY

## 2022-01-02 PROCEDURE — 25010000002 CEFAZOLIN PER 500 MG: Performed by: SURGERY

## 2022-01-02 DEVICE — HORIZON TI ML 6 CLIPS/CART
Type: IMPLANTABLE DEVICE | Site: ABDOMEN | Status: FUNCTIONAL
Brand: WECK

## 2022-01-02 RX ORDER — HYDROMORPHONE HCL 110MG/55ML
PATIENT CONTROLLED ANALGESIA SYRINGE INTRAVENOUS AS NEEDED
Status: DISCONTINUED | OUTPATIENT
Start: 2022-01-02 | End: 2022-01-02 | Stop reason: SURG

## 2022-01-02 RX ORDER — LABETALOL HYDROCHLORIDE 5 MG/ML
5 INJECTION, SOLUTION INTRAVENOUS
Status: DISCONTINUED | OUTPATIENT
Start: 2022-01-02 | End: 2022-01-02 | Stop reason: HOSPADM

## 2022-01-02 RX ORDER — SODIUM CHLORIDE, SODIUM LACTATE, POTASSIUM CHLORIDE, CALCIUM CHLORIDE 600; 310; 30; 20 MG/100ML; MG/100ML; MG/100ML; MG/100ML
9 INJECTION, SOLUTION INTRAVENOUS CONTINUOUS
Status: DISCONTINUED | OUTPATIENT
Start: 2022-01-02 | End: 2022-01-03

## 2022-01-02 RX ORDER — MIDAZOLAM HYDROCHLORIDE 1 MG/ML
1 INJECTION INTRAMUSCULAR; INTRAVENOUS
Status: DISCONTINUED | OUTPATIENT
Start: 2022-01-02 | End: 2022-01-02 | Stop reason: HOSPADM

## 2022-01-02 RX ORDER — NALOXONE HCL 0.4 MG/ML
0.4 VIAL (ML) INJECTION AS NEEDED
Status: DISCONTINUED | OUTPATIENT
Start: 2022-01-02 | End: 2022-01-02 | Stop reason: HOSPADM

## 2022-01-02 RX ORDER — HYDRALAZINE HYDROCHLORIDE 20 MG/ML
10 INJECTION INTRAMUSCULAR; INTRAVENOUS
Status: DISCONTINUED | OUTPATIENT
Start: 2022-01-02 | End: 2022-01-02 | Stop reason: HOSPADM

## 2022-01-02 RX ORDER — OXYCODONE HYDROCHLORIDE AND ACETAMINOPHEN 5; 325 MG/1; MG/1
1 TABLET ORAL ONCE AS NEEDED
Status: DISCONTINUED | OUTPATIENT
Start: 2022-01-02 | End: 2022-01-02 | Stop reason: HOSPADM

## 2022-01-02 RX ORDER — ONDANSETRON 2 MG/ML
INJECTION INTRAMUSCULAR; INTRAVENOUS AS NEEDED
Status: DISCONTINUED | OUTPATIENT
Start: 2022-01-02 | End: 2022-01-02 | Stop reason: SURG

## 2022-01-02 RX ORDER — HYDROMORPHONE HYDROCHLORIDE 1 MG/ML
0.25 INJECTION, SOLUTION INTRAMUSCULAR; INTRAVENOUS; SUBCUTANEOUS
Status: DISCONTINUED | OUTPATIENT
Start: 2022-01-02 | End: 2022-01-02 | Stop reason: HOSPADM

## 2022-01-02 RX ORDER — HYDROMORPHONE HYDROCHLORIDE 1 MG/ML
0.5 INJECTION, SOLUTION INTRAMUSCULAR; INTRAVENOUS; SUBCUTANEOUS
Status: DISCONTINUED | OUTPATIENT
Start: 2022-01-02 | End: 2022-01-02 | Stop reason: HOSPADM

## 2022-01-02 RX ORDER — NEOSTIGMINE METHYLSULFATE 1 MG/ML
INJECTION, SOLUTION INTRAVENOUS AS NEEDED
Status: DISCONTINUED | OUTPATIENT
Start: 2022-01-02 | End: 2022-01-02 | Stop reason: SURG

## 2022-01-02 RX ORDER — SODIUM CHLORIDE 9 MG/ML
INJECTION, SOLUTION INTRAVENOUS AS NEEDED
Status: DISCONTINUED | OUTPATIENT
Start: 2022-01-02 | End: 2022-01-02 | Stop reason: HOSPADM

## 2022-01-02 RX ORDER — KETAMINE HYDROCHLORIDE 10 MG/ML
INJECTION INTRAMUSCULAR; INTRAVENOUS AS NEEDED
Status: DISCONTINUED | OUTPATIENT
Start: 2022-01-02 | End: 2022-01-02 | Stop reason: SURG

## 2022-01-02 RX ORDER — DIPHENHYDRAMINE HYDROCHLORIDE 50 MG/ML
6.25 INJECTION INTRAMUSCULAR; INTRAVENOUS
Status: DISCONTINUED | OUTPATIENT
Start: 2022-01-02 | End: 2022-01-02 | Stop reason: HOSPADM

## 2022-01-02 RX ORDER — LIDOCAINE HYDROCHLORIDE 20 MG/ML
INJECTION, SOLUTION INFILTRATION; PERINEURAL AS NEEDED
Status: DISCONTINUED | OUTPATIENT
Start: 2022-01-02 | End: 2022-01-02 | Stop reason: SURG

## 2022-01-02 RX ORDER — ONDANSETRON 2 MG/ML
4 INJECTION INTRAMUSCULAR; INTRAVENOUS ONCE AS NEEDED
Status: DISCONTINUED | OUTPATIENT
Start: 2022-01-02 | End: 2022-01-02 | Stop reason: HOSPADM

## 2022-01-02 RX ORDER — CEFAZOLIN SODIUM IN 0.9 % NACL 3 G/100 ML
3 INTRAVENOUS SOLUTION, PIGGYBACK (ML) INTRAVENOUS ONCE
Status: COMPLETED | OUTPATIENT
Start: 2022-01-02 | End: 2022-01-02

## 2022-01-02 RX ORDER — SODIUM CHLORIDE 0.9 % (FLUSH) 0.9 %
3 SYRINGE (ML) INJECTION EVERY 12 HOURS SCHEDULED
Status: DISCONTINUED | OUTPATIENT
Start: 2022-01-02 | End: 2022-01-02 | Stop reason: HOSPADM

## 2022-01-02 RX ORDER — FENTANYL CITRATE 50 UG/ML
50 INJECTION, SOLUTION INTRAMUSCULAR; INTRAVENOUS
Status: DISCONTINUED | OUTPATIENT
Start: 2022-01-02 | End: 2022-01-02 | Stop reason: HOSPADM

## 2022-01-02 RX ORDER — FLUMAZENIL 0.1 MG/ML
0.2 INJECTION INTRAVENOUS AS NEEDED
Status: DISCONTINUED | OUTPATIENT
Start: 2022-01-02 | End: 2022-01-02 | Stop reason: HOSPADM

## 2022-01-02 RX ORDER — EPHEDRINE SULFATE 50 MG/ML
5 INJECTION, SOLUTION INTRAVENOUS ONCE AS NEEDED
Status: DISCONTINUED | OUTPATIENT
Start: 2022-01-02 | End: 2022-01-02 | Stop reason: HOSPADM

## 2022-01-02 RX ORDER — HYDROCODONE BITARTRATE AND ACETAMINOPHEN 5; 325 MG/1; MG/1
1 TABLET ORAL ONCE AS NEEDED
Status: DISCONTINUED | OUTPATIENT
Start: 2022-01-02 | End: 2022-01-02 | Stop reason: HOSPADM

## 2022-01-02 RX ORDER — LIDOCAINE HYDROCHLORIDE 10 MG/ML
0.5 INJECTION, SOLUTION EPIDURAL; INFILTRATION; INTRACAUDAL; PERINEURAL ONCE AS NEEDED
Status: DISCONTINUED | OUTPATIENT
Start: 2022-01-02 | End: 2022-01-02 | Stop reason: HOSPADM

## 2022-01-02 RX ORDER — KETOROLAC TROMETHAMINE 30 MG/ML
INJECTION, SOLUTION INTRAMUSCULAR; INTRAVENOUS AS NEEDED
Status: DISCONTINUED | OUTPATIENT
Start: 2022-01-02 | End: 2022-01-02 | Stop reason: SURG

## 2022-01-02 RX ORDER — PROPOFOL 10 MG/ML
VIAL (ML) INTRAVENOUS AS NEEDED
Status: DISCONTINUED | OUTPATIENT
Start: 2022-01-02 | End: 2022-01-02 | Stop reason: SURG

## 2022-01-02 RX ORDER — MAGNESIUM HYDROXIDE 1200 MG/15ML
LIQUID ORAL AS NEEDED
Status: DISCONTINUED | OUTPATIENT
Start: 2022-01-02 | End: 2022-01-02 | Stop reason: HOSPADM

## 2022-01-02 RX ORDER — ROCURONIUM BROMIDE 10 MG/ML
INJECTION, SOLUTION INTRAVENOUS AS NEEDED
Status: DISCONTINUED | OUTPATIENT
Start: 2022-01-02 | End: 2022-01-02 | Stop reason: SURG

## 2022-01-02 RX ORDER — DEXAMETHASONE SODIUM PHOSPHATE 10 MG/ML
INJECTION INTRAMUSCULAR; INTRAVENOUS AS NEEDED
Status: DISCONTINUED | OUTPATIENT
Start: 2022-01-02 | End: 2022-01-02 | Stop reason: SURG

## 2022-01-02 RX ORDER — FAMOTIDINE 10 MG/ML
20 INJECTION, SOLUTION INTRAVENOUS ONCE
Status: COMPLETED | OUTPATIENT
Start: 2022-01-02 | End: 2022-01-02

## 2022-01-02 RX ORDER — SODIUM CHLORIDE 0.9 % (FLUSH) 0.9 %
3-10 SYRINGE (ML) INJECTION AS NEEDED
Status: DISCONTINUED | OUTPATIENT
Start: 2022-01-02 | End: 2022-01-02 | Stop reason: HOSPADM

## 2022-01-02 RX ORDER — BUPIVACAINE HYDROCHLORIDE AND EPINEPHRINE 5; 5 MG/ML; UG/ML
INJECTION, SOLUTION EPIDURAL; INTRACAUDAL; PERINEURAL AS NEEDED
Status: DISCONTINUED | OUTPATIENT
Start: 2022-01-02 | End: 2022-01-02 | Stop reason: HOSPADM

## 2022-01-02 RX ORDER — FENTANYL CITRATE 50 UG/ML
INJECTION, SOLUTION INTRAMUSCULAR; INTRAVENOUS AS NEEDED
Status: DISCONTINUED | OUTPATIENT
Start: 2022-01-02 | End: 2022-01-02 | Stop reason: SURG

## 2022-01-02 RX ADMIN — MORPHINE SULFATE 4 MG: 2 INJECTION, SOLUTION INTRAMUSCULAR; INTRAVENOUS at 18:37

## 2022-01-02 RX ADMIN — FENTANYL CITRATE 50 MCG: 0.05 INJECTION, SOLUTION INTRAMUSCULAR; INTRAVENOUS at 14:45

## 2022-01-02 RX ADMIN — LIDOCAINE HYDROCHLORIDE 100 MG: 20 INJECTION, SOLUTION INFILTRATION; PERINEURAL at 14:49

## 2022-01-02 RX ADMIN — CEFAZOLIN SODIUM 3 G: 10 INJECTION, POWDER, FOR SOLUTION INTRAVENOUS at 14:37

## 2022-01-02 RX ADMIN — KETOROLAC TROMETHAMINE 30 MG: 30 INJECTION, SOLUTION INTRAMUSCULAR at 15:35

## 2022-01-02 RX ADMIN — HYDROMORPHONE HYDROCHLORIDE 0.5 MG: 1 INJECTION, SOLUTION INTRAMUSCULAR; INTRAVENOUS; SUBCUTANEOUS at 16:18

## 2022-01-02 RX ADMIN — ONDANSETRON 4 MG: 2 INJECTION INTRAMUSCULAR; INTRAVENOUS at 15:34

## 2022-01-02 RX ADMIN — FAMOTIDINE 20 MG: 10 INJECTION INTRAVENOUS at 13:35

## 2022-01-02 RX ADMIN — KETAMINE HYDROCHLORIDE 10 MG: 10 INJECTION INTRAMUSCULAR; INTRAVENOUS at 15:18

## 2022-01-02 RX ADMIN — OXYCODONE AND ACETAMINOPHEN 1 TABLET: 5; 325 TABLET ORAL at 22:03

## 2022-01-02 RX ADMIN — KETAMINE HYDROCHLORIDE 30 MG: 10 INJECTION INTRAMUSCULAR; INTRAVENOUS at 14:49

## 2022-01-02 RX ADMIN — FENTANYL CITRATE 50 MCG: 50 INJECTION INTRAMUSCULAR; INTRAVENOUS at 16:17

## 2022-01-02 RX ADMIN — FENTANYL CITRATE 50 MCG: 50 INJECTION INTRAMUSCULAR; INTRAVENOUS at 16:31

## 2022-01-02 RX ADMIN — ONDANSETRON 4 MG: 2 INJECTION INTRAMUSCULAR; INTRAVENOUS at 21:05

## 2022-01-02 RX ADMIN — FENTANYL CITRATE 50 MCG: 0.05 INJECTION, SOLUTION INTRAMUSCULAR; INTRAVENOUS at 14:57

## 2022-01-02 RX ADMIN — HYDROMORPHONE HYDROCHLORIDE 0.5 MG: 2 INJECTION, SOLUTION INTRAMUSCULAR; INTRAVENOUS; SUBCUTANEOUS at 15:19

## 2022-01-02 RX ADMIN — SODIUM CHLORIDE, PRESERVATIVE FREE 10 ML: 5 INJECTION INTRAVENOUS at 21:05

## 2022-01-02 RX ADMIN — DEXAMETHASONE SODIUM PHOSPHATE 10 MG: 10 INJECTION INTRAMUSCULAR; INTRAVENOUS at 14:53

## 2022-01-02 RX ADMIN — HYDROMORPHONE HYDROCHLORIDE 0.5 MG: 1 INJECTION, SOLUTION INTRAMUSCULAR; INTRAVENOUS; SUBCUTANEOUS at 16:33

## 2022-01-02 RX ADMIN — FENTANYL CITRATE 50 MCG: 50 INJECTION INTRAMUSCULAR; INTRAVENOUS at 16:56

## 2022-01-02 RX ADMIN — ROCURONIUM BROMIDE 40 MG: 50 INJECTION INTRAVENOUS at 14:50

## 2022-01-02 RX ADMIN — PANTOPRAZOLE SODIUM 40 MG: 40 TABLET, DELAYED RELEASE ORAL at 06:32

## 2022-01-02 RX ADMIN — SODIUM CHLORIDE, POTASSIUM CHLORIDE, SODIUM LACTATE AND CALCIUM CHLORIDE 9 ML/HR: 600; 310; 30; 20 INJECTION, SOLUTION INTRAVENOUS at 13:34

## 2022-01-02 RX ADMIN — MIDAZOLAM 1 MG: 1 INJECTION INTRAMUSCULAR; INTRAVENOUS at 13:35

## 2022-01-02 RX ADMIN — KETAMINE HYDROCHLORIDE 10 MG: 10 INJECTION INTRAMUSCULAR; INTRAVENOUS at 15:36

## 2022-01-02 RX ADMIN — SODIUM CHLORIDE, POTASSIUM CHLORIDE, SODIUM LACTATE AND CALCIUM CHLORIDE 9 ML/HR: 600; 310; 30; 20 INJECTION, SOLUTION INTRAVENOUS at 16:34

## 2022-01-02 RX ADMIN — HYDROMORPHONE HYDROCHLORIDE 0.5 MG: 1 INJECTION, SOLUTION INTRAMUSCULAR; INTRAVENOUS; SUBCUTANEOUS at 16:57

## 2022-01-02 RX ADMIN — PROPOFOL 200 MG: 10 INJECTION, EMULSION INTRAVENOUS at 14:49

## 2022-01-02 RX ADMIN — GLYCOPYRROLATE 0.6 MG: 0.2 INJECTION, SOLUTION INTRAMUSCULAR; INTRAVITREAL at 15:34

## 2022-01-02 RX ADMIN — SODIUM CHLORIDE, POTASSIUM CHLORIDE, SODIUM LACTATE AND CALCIUM CHLORIDE 100 ML/HR: 600; 310; 30; 20 INJECTION, SOLUTION INTRAVENOUS at 04:20

## 2022-01-02 RX ADMIN — NEOSTIGMINE METHYLSULFATE 3 MG: 1 INJECTION INTRAVENOUS at 15:34

## 2022-01-02 NOTE — PLAN OF CARE
Goal Outcome Evaluation:  Plan of Care Reviewed With: patient        Progress: no change  Outcome Summary: Pt reports abdominal discomfort only with eating, otherwise denied need for prn pain medication, no nausea/vomting, IVF infusing, vss, afebrile, NPO since midnight for cholecytectomy later today.

## 2022-01-02 NOTE — OP NOTE
Surgeon: Toni Villatoro Jr., M.D.    Assistant: Lynn Thacker PA-C and Hazel Lara CSA    An assistant was necessary and provided valuable retraction and exposure, as well as assistance with camera holding, gallbladder manipulation, and wound closure.    Pre-Operative Diagnosis:     Calculus of gallbladder and bile duct with chronic cholecystitis with obstruction [K80.65]    Post-Operative Diagnosis:    Cholelithiasis with cholecystitis and choledocholithiasis    Procedure Performed:     Laparoscopic cholecystectomy with intraoperative cholangiogram    Indications:     The patient is a pleasant 21-year-old female who was diagnosed with symptomatic cholelithiasis and scheduled for a laparoscopic cholecystectomy.  She had increased symptoms of pain and presented to the emergency room where she was noted to have elevation of her liver function test.  She was admitted overnight and her liver function test improved and she now presents for laparoscopic cholecystectomy with intraoperative cholangiogram.  The patient understands the indications, alternatives, risks, and benefits of the procedure and wishes to proceed.    Procedure:     The patient was identified and taken to the operating room where she was placed in the supine position on the operating table.  Monitors were placed and she underwent general endotracheal anesthesia and was appropriately monitored throughout the case by the anesthesia personnel.  The abdomen was prepped and draped in the standard surgical fashion.  Each incision was infiltrated with 0.5% Marcaine with epinephrine prior to making the incision.  A supraumbilical incision was made and carried through the skin into the subcutaneous tissue.  The abdominal wall was elevated with skin hooks and a Veress needle was inserted through the incision into the abdomen without difficulty.  The abdomen was then insufflated with low pressures encountered initially.  Once fully insufflated, the Veress  needle was removed and a 5 mm port was placed in the same incision, again with traction applied to the abdominal wall using skin hooks.  The laparoscope was inserted and the area of Veress needle and port insertion was inspected, no injury had occurred.  Attention was then turned to the upper abdomen.  A 10 mm subxiphoid port was placed by making skin incision carried through the skin into the subcutaneous tissue and inserting the port through the incision into the abdomen with direct visualization using the laparoscope.  Two 5 mm ports were placed in the right upper quadrant in the same fashion.  The liver edge was elevated and the gallbladder was visualized.  The gallbladder had a thickened wall and pericholecystic fluid.  It was also distended and aspirating needle was used to aspirated.  The gallbladder was grasped and elevated over the liver.  Adhesions were taken down with blunt dissection and Bovie electrocautery.  The gallbladder was then grasped at the infundibulum and traction was applied to open the triangle of Calot.  The triangle of Calot was carefully and meticulously dissected.  The cystic duct was identified and surrounded.  A clip was placed at the infundibulum cystic duct junction.  A cholangiocatheter was introduced into the abdomen through an Angiocath.  A small incision was created in the cystic duct with scissors and the cholangiocatheter was inserted into the cystic duct.  It was secured with a clip.  A cholangiogram was then performed that confirmed our impression of the anatomy, showed a dilated common bile duct with an apparent filling defect distally, and no contrast material entering the duodenum.  The cholangiocatheter was removed and the cystic duct was divided after placing 2 clips proximally and dividing with the scissors.  The cystic artery was also identified, surrounded, and divided after placing 2 clips proximally and dividing distally using Bovie electrocautery.  The gallbladder  was removed from the gallbladder fossa using Bovie electrocautery.  It was then passed out of the abdomen through the epigastric port site in an Endo Catch bag.  The gallbladder fossa was carefully inspected and noted to be hemostatic with no evidence of a bile leak.  The abdomen was deflated and all ports were removed.  The fascia of the subxiphoid port was closed with an 0 vicryl suture in a figure-of-eight fashion.  All skin incisions were closed with a 4-0 Monocryl in a subcuticular fashion followed by benzoin and Steri-Strips.  The sponge, needle, and instrument counts were correct at the end of the case.  The patient tolerated the procedure well and was transferred to the recovery room in stable condition.    Estimated Blood Loss:      minimal    Specimens:     Order Name Source Comment Collection Info Order Time   TISSUE PATHOLOGY EXAM Gallbladder  Collected By: Toni Villatoro Jr., MD 1/2/2022  3:16 PM     Release to patient   Immediate            Toni Villatoro Jr., M.D.

## 2022-01-02 NOTE — ANESTHESIA PROCEDURE NOTES
Airway  Urgency: elective    Date/Time: 1/2/2022 2:52 PM  Airway not difficult    General Information and Staff    Patient location during procedure: OR  Anesthesiologist: Paul Hoffman MD    Indications and Patient Condition  Indications for airway management: airway protection    Preoxygenated: yes  Mask difficulty assessment: 1 - vent by mask    Final Airway Details  Final airway type: endotracheal airway      Successful airway: ETT  Cuffed: yes   Successful intubation technique: direct laryngoscopy  Endotracheal tube insertion site: oral  Blade: Aguila  Blade size: 3  ETT size (mm): 7.0  Cormack-Lehane Classification: grade IIb - view of arytenoids or posterior of glottis only  Placement verified by: chest auscultation and capnometry   Measured from: lips  ETT/EBT  to lips (cm): 22  Number of attempts at approach: 1  Assessment: lips, teeth, and gum same as pre-op and atraumatic intubation

## 2022-01-02 NOTE — ANESTHESIA POSTPROCEDURE EVALUATION
Patient: Ivis Madrid    Procedure Summary     Date: 01/02/22 Room / Location: [facility] OR [address] [place] [address] MAIN OR    Anesthesia Start: 1442 Anesthesia Stop: 1556    Procedure: CHOLECYSTECTOMY LAPAROSCOPIC INTRAOPERATIVE CHOLANGIOGRAM (N/A Abdomen) Diagnosis:       Calculus of gallbladder and bile duct with chronic cholecystitis with obstruction      (Calculus of gallbladder and bile duct with chronic cholecystitis with obstruction [K80.65])    Surgeons: Toni Villatoro Jr., MD Provider: Paul Hoffman MD    Anesthesia Type: general ASA Status: 3          Anesthesia Type: general    Vitals  Vitals Value Taken Time   /93 01/02/22 1701   Temp 36.6 °C (97.9 °F) 01/02/22 1700   Pulse 76 01/02/22 1705   Resp 16 01/02/22 1700   SpO2 100 % 01/02/22 1705   Vitals shown include unvalidated device data.        Post Anesthesia Care and Evaluation    Patient location during evaluation: bedside  Patient participation: complete - patient participated  Level of consciousness: sleepy but conscious  Pain score: 0  Pain management: adequate  Airway patency: patent  Anesthetic complications: No anesthetic complications    Cardiovascular status: acceptable  Respiratory status: acceptable  Hydration status: acceptable    Comments: /93   Pulse 98   Temp 36.6 °C (97.9 °F) (Oral)   Resp 16   LMP 01/02/2022   SpO2 100%

## 2022-01-02 NOTE — PLAN OF CARE
Goal Outcome Evaluation:  Plan of Care Reviewed With: patient        Progress: no change  Outcome Summary: VSS. Lap petra today, plan for ERCP tomorrow. Clear liquids this evening and NPO after midnight

## 2022-01-02 NOTE — ANESTHESIA PREPROCEDURE EVALUATION
Anesthesia Evaluation     Patient summary reviewed and Nursing notes reviewed   NPO Solid Status: > 8 hours  NPO Liquid Status: > 8 hours           Airway   Mallampati: II  Neck ROM: full  No difficulty expected  Dental - normal exam     Pulmonary     breath sounds clear to auscultation  Cardiovascular     Rhythm: regular        Neuro/Psych  (+) psychiatric history,     GI/Hepatic/Renal/Endo    (+) obesity, morbid obesity,      Musculoskeletal     Abdominal   (+) obese,    Substance History      OB/GYN          Other                        Anesthesia Plan    ASA 3     general     intravenous induction     Anesthetic plan, all risks, benefits, and alternatives have been provided, discussed and informed consent has been obtained with: patient.

## 2022-01-02 NOTE — PROGRESS NOTES
Chief Complaint:    Cholelithiasis with cholecystitis and choledocholithiasis    Subjective:    The patient is feeling better today with improvement in her abdominal pain.    Objective:    Temp:  [97.1 °F (36.2 °C)-98.4 °F (36.9 °C)] 98.2 °F (36.8 °C)  Heart Rate:  [] 72  Resp:  [16] 16  BP: (101-136)/(65-84) 111/74    Physical Exam  Constitutional:       Appearance: She is not ill-appearing or toxic-appearing.   Neurological:      Mental Status: She is alert.   Psychiatric:         Behavior: Behavior is cooperative.         Results:    WBC is 6.70.  H/H is 12.6/30.8.  ALT is 163, AST is 88, alkaline phosphatase is 116, total bilirubin is 1.5.    Assessment/Plan:    The patient has cholelithiasis with chronic cholecystitis and presented with suspicion for choledocholithiasis.  Her liver function test have improved suggesting passage of a CBD stone.  We will plan to proceed with a laparoscopic cholecystectomy with intraoperative cholangiogram.  The patient understands the indications, alternatives, risks, and benefits of the procedure and wishes to proceed.    Toni Villatoro Jr., M.D.

## 2022-01-03 ENCOUNTER — APPOINTMENT (OUTPATIENT)
Dept: GENERAL RADIOLOGY | Facility: HOSPITAL | Age: 22
End: 2022-01-03

## 2022-01-03 ENCOUNTER — ANESTHESIA EVENT (OUTPATIENT)
Dept: GASTROENTEROLOGY | Facility: HOSPITAL | Age: 22
End: 2022-01-03

## 2022-01-03 ENCOUNTER — ANESTHESIA (OUTPATIENT)
Dept: GASTROENTEROLOGY | Facility: HOSPITAL | Age: 22
End: 2022-01-03

## 2022-01-03 PROBLEM — K80.51 CALCULUS OF BILE DUCT WITHOUT CHOLECYSTITIS WITH OBSTRUCTION: Status: ACTIVE | Noted: 2022-01-01

## 2022-01-03 PROCEDURE — G0378 HOSPITAL OBSERVATION PER HR: HCPCS

## 2022-01-03 PROCEDURE — 25010000002 ONDANSETRON PER 1 MG: Performed by: INTERNAL MEDICINE

## 2022-01-03 PROCEDURE — 99024 POSTOP FOLLOW-UP VISIT: CPT | Performed by: SURGERY

## 2022-01-03 PROCEDURE — 25010000002 PROPOFOL 10 MG/ML EMULSION: Performed by: NURSE ANESTHETIST, CERTIFIED REGISTERED

## 2022-01-03 PROCEDURE — 43262 ENDO CHOLANGIOPANCREATOGRAPH: CPT | Performed by: INTERNAL MEDICINE

## 2022-01-03 PROCEDURE — 25010000002 ONDANSETRON PER 1 MG: Performed by: SURGERY

## 2022-01-03 PROCEDURE — 99213 OFFICE O/P EST LOW 20 MIN: CPT | Performed by: INTERNAL MEDICINE

## 2022-01-03 PROCEDURE — 43264 ERCP REMOVE DUCT CALCULI: CPT | Performed by: INTERNAL MEDICINE

## 2022-01-03 PROCEDURE — C1769 GUIDE WIRE: HCPCS | Performed by: INTERNAL MEDICINE

## 2022-01-03 PROCEDURE — 25010000002 IOPAMIDOL 61 % SOLUTION: Performed by: INTERNAL MEDICINE

## 2022-01-03 PROCEDURE — 74328 X-RAY BILE DUCT ENDOSCOPY: CPT

## 2022-01-03 RX ORDER — LIDOCAINE HYDROCHLORIDE 20 MG/ML
INJECTION, SOLUTION INFILTRATION; PERINEURAL AS NEEDED
Status: DISCONTINUED | OUTPATIENT
Start: 2022-01-03 | End: 2022-01-03 | Stop reason: SURG

## 2022-01-03 RX ORDER — PROPOFOL 10 MG/ML
VIAL (ML) INTRAVENOUS AS NEEDED
Status: DISCONTINUED | OUTPATIENT
Start: 2022-01-03 | End: 2022-01-03 | Stop reason: SURG

## 2022-01-03 RX ORDER — SODIUM CHLORIDE 9 MG/ML
30 INJECTION, SOLUTION INTRAVENOUS CONTINUOUS PRN
Status: DISCONTINUED | OUTPATIENT
Start: 2022-01-03 | End: 2022-01-04 | Stop reason: HOSPADM

## 2022-01-03 RX ORDER — PROPOFOL 10 MG/ML
VIAL (ML) INTRAVENOUS CONTINUOUS PRN
Status: DISCONTINUED | OUTPATIENT
Start: 2022-01-03 | End: 2022-01-03 | Stop reason: SURG

## 2022-01-03 RX ADMIN — ONDANSETRON 4 MG: 2 INJECTION INTRAMUSCULAR; INTRAVENOUS at 16:55

## 2022-01-03 RX ADMIN — SODIUM CHLORIDE 30 ML/HR: 9 INJECTION, SOLUTION INTRAVENOUS at 13:29

## 2022-01-03 RX ADMIN — PANTOPRAZOLE SODIUM 40 MG: 40 TABLET, DELAYED RELEASE ORAL at 16:55

## 2022-01-03 RX ADMIN — SODIUM CHLORIDE, PRESERVATIVE FREE 10 ML: 5 INJECTION INTRAVENOUS at 08:30

## 2022-01-03 RX ADMIN — OXYCODONE AND ACETAMINOPHEN 1 TABLET: 5; 325 TABLET ORAL at 23:14

## 2022-01-03 RX ADMIN — Medication 150 MCG/KG/MIN: at 14:17

## 2022-01-03 RX ADMIN — PROPOFOL 200 MG: 10 INJECTION, EMULSION INTRAVENOUS at 14:17

## 2022-01-03 RX ADMIN — PROPOFOL 100 MG: 10 INJECTION, EMULSION INTRAVENOUS at 14:25

## 2022-01-03 RX ADMIN — LIDOCAINE HYDROCHLORIDE 60 MG: 20 INJECTION, SOLUTION INFILTRATION; PERINEURAL at 14:17

## 2022-01-03 RX ADMIN — SODIUM CHLORIDE, PRESERVATIVE FREE 10 ML: 5 INJECTION INTRAVENOUS at 20:15

## 2022-01-03 RX ADMIN — PROPOFOL 100 MG: 10 INJECTION, EMULSION INTRAVENOUS at 14:20

## 2022-01-03 NOTE — ANESTHESIA PREPROCEDURE EVALUATION
Anesthesia Evaluation     Patient summary reviewed and Nursing notes reviewed                Airway   Mallampati: I  TM distance: >3 FB  Neck ROM: full  No difficulty expected  Dental - normal exam     Pulmonary - negative pulmonary ROS and normal exam   Cardiovascular - negative cardio ROS and normal exam        Neuro/Psych  (+) psychiatric history Anxiety,     GI/Hepatic/Renal/Endo    (+) obesity, morbid obesity,      Musculoskeletal (-) negative ROS    Abdominal  - normal exam    Bowel sounds: normal.   Substance History - negative use     OB/GYN negative ob/gyn ROS         Other                        Anesthesia Plan    ASA 3     MAC       Anesthetic plan, all risks, benefits, and alternatives have been provided, discussed and informed consent has been obtained with: patient.

## 2022-01-03 NOTE — PLAN OF CARE
Goal Outcome Evaluation:   ERCP done today. Diet advanced to regular low fat for dinner. Minimal pain. Ambulated in the temple. Lap sites with dried drainage on steristrips - still intact

## 2022-01-03 NOTE — CONSULTS
[facility]  Initial Inpatient Consult Note    Referring Provider: Dr. Toni Villatoro    Reason for Consultation: Bile duct obstruction    Subjective     History of present illness:    21 y.o. female with history of GERD, polycystic ovarian syndrome with recurrent biliary colic and elevated LFTs.  Patient with known gallstones with plans for elective cholecystectomy at the end of the month presented with for 5 days worth symptoms.  Patient seen in the hospital underwent cholecystectomy.  Intra-Op cholangiogram revealed obstructed distal common duct now referred for ERCP.  Patient feeling somewhat improved from severe pain but still symptomatic.    Past Medical History:  Past Medical History:   Diagnosis Date   • Anxiety    • Heart burn    • PCOS (polycystic ovarian syndrome)      Past Surgical History:  Past Surgical History:   Procedure Laterality Date   • CHOLECYSTECTOMY WITH INTRAOPERATIVE CHOLANGIOGRAM N/A 1/2/2022    Procedure: CHOLECYSTECTOMY LAPAROSCOPIC INTRAOPERATIVE CHOLANGIOGRAM;  Surgeon: Toni Villatoro Jr., MD;  Location: [facility];  Service: General;  Laterality: N/A;   • INNER EAR SURGERY     • KNEE SURGERY Right       Social History:   Social History     Tobacco Use   • Smoking status: Never Smoker   • Smokeless tobacco: Never Used   Substance Use Topics   • Alcohol use: Yes     Comment: OCC       Family History:  Family History   Problem Relation Age of Onset   • Diabetes Father    • Arthritis Father    • Arthritis Mother    • Asthma Mother    • Heart disease Paternal Grandfather    • Colon cancer Neg Hx    • Colon polyps Neg Hx    • Crohn's disease Neg Hx    • Irritable bowel syndrome Neg Hx    • Ulcerative colitis Neg Hx        Home Meds:  Medications Prior to Admission   Medication Sig Dispense Refill Last Dose   • fexofenadine (ALLEGRA) 60 MG tablet Take 60 mg by mouth Daily.   Past Week at Unknown time   • LO LOESTRIN FE 1 MG-10 MCG / 10 MCG tablet       •  omeprazole (priLOSEC) 20 MG capsule Take 20 mg by mouth Daily.   1/1/2022 at Unknown time   • ondansetron ODT (ZOFRAN-ODT) 4 MG disintegrating tablet Dissolve 1 tablet by mouth every 6 hours PRN nausea, vomiting 60 tablet 1 Past Week at Unknown time     Current Meds:   pantoprazole, 40 mg, Oral, QAM  sodium chloride, 10 mL, Intravenous, Q12H      Allergies:  Allergies   Allergen Reactions   • Sulfa Antibiotics Hives     Review of Systems  All systems were reviewed and negative except for:  Gastrointestinal: positive for  pain     Objective     Vital Signs  Temp:  [97.1 °F (36.2 °C)-98.4 °F (36.9 °C)] 98.4 °F (36.9 °C)  Heart Rate:  [] 85  Resp:  [14-16] 16  BP: (103-161)/() 110/66  Physical Exam:  General Appearance:    Alert, cooperative, in no acute distress   Head:    Normocephalic, without obvious abnormality, atraumatic   Eyes:          conjunctivae and sclerae normal, no   icterus   Throat:   no thrush, oral mucosa moist   Neck:   Supple, no adenopathy   Lungs:     Clear to auscultation bilaterally    Heart:    Regular rhythm and normal rate    Chest Wall:    No abnormalities observed   Abdomen:     Soft, nondistended, nontender; normal bowel sounds   Extremities:   no edema, no redness   Skin:   No bruising or rash   Psychiatric:  normal mood and insight     Results Review:   I reviewed the patient's new clinical results.  I reviewed the patient's new imaging results and agree with the interpretation.    Results from last 7 days   Lab Units 01/02/22  0730 01/01/22  1343   WBC 10*3/mm3 6.70 9.30   HEMOGLOBIN g/dL 12.6 13.2   HEMATOCRIT % 38.8 39.4   PLATELETS 10*3/mm3 278 307     Results from last 7 days   Lab Units 01/02/22  0730 01/01/22  1343   SODIUM mmol/L 140 138   POTASSIUM mmol/L 3.9 3.9   CHLORIDE mmol/L 102 101   CO2 mmol/L 27.0 25.0   BUN mg/dL 6 8   CREATININE mg/dL 0.68 0.69   CALCIUM mg/dL 9.4 9.6   BILIRUBIN mg/dL 1.5* 2.7*   ALK PHOS U/L 116 123*   ALT (SGPT) U/L 163* 193*   AST  (SGOT) U/L 88* 101*   GLUCOSE mg/dL 81 82         Lab Results   Lab Value Date/Time    LIPASE 34 01/02/2022 0730    LIPASE 48 01/01/2022 1343       Radiology:  FL Cholangiogram Operative   Final Result      FL ercp biliary duct only    (Results Pending)       Assessment/Plan   Patient Active Problem List   Diagnosis   • Calculus of gallbladder and bile duct with chronic cholecystitis with obstruction   • Elevated LFTs   • Calculus of bile duct without cholecystitis with obstruction       Assessment:  1. Bile duct obstruction  2. Status post lap petra with abnormal intraoperative cholangiogram    Plan:  · Agree with need for ERCP to clear common duct.  Discussed risk benefits with patient and family who agreed to proceed.  All questions were answered.      I discussed the patients findings and my recommendations with patient and family.    Robin Mg MD

## 2022-01-03 NOTE — PROGRESS NOTES
Chief Complaint:    S/P Laparoscopic cholecystectomy with intraoperative cholangiogram, POD 1    Subjective:    The patient is feeling well with only expected postop abdominal pain.  She is tolerating a diet with no nausea or vomiting.  She had an ERCP with CBD stone removal earlier today.    Objective:    Temp:  [97.1 °F (36.2 °C)-98.4 °F (36.9 °C)] 98 °F (36.7 °C)  Heart Rate:  [] 93  Resp:  [14-16] 16  BP: (103-118)/(66-75) 117/75    Physical Exam  Constitutional:       Appearance: She is not ill-appearing or toxic-appearing.   Abdominal:      Palpations: Abdomen is soft.      Tenderness: There is no abdominal tenderness.   Neurological:      Mental Status: She is alert.   Psychiatric:         Behavior: Behavior is cooperative.         Results:    There are no new labs today.    Impression/Plan:    The patient is POD 1 from a laparoscopic cholecystectomy with intraoperative cholangiogram.  She had a ERCP with CBD stone removal today.  She is recovering well and will likely be discharged to home tomorrow.    Toni Villatoro Jr., M.D.

## 2022-01-03 NOTE — PLAN OF CARE
Goal Outcome Evaluation:  Plan of Care Reviewed With: patient        Progress: improving  Outcome Summary: Lap sites x4 with steri strips are dry and intact, adequate pain control with Percocet, Zofran given for intermittent nausea, ambulates in temple, vss, afebrile, NPO since midnight for ERCP today.

## 2022-01-03 NOTE — ANESTHESIA POSTPROCEDURE EVALUATION
Patient: Ivis Madrid    Procedure Summary     Date: 01/03/22 Room / Location: [facility] ENDOSCOPY 5 / [facility] ENDOSCOPY    Anesthesia Start: 1414 Anesthesia Stop: 1448    Procedure: ENDOSCOPIC RETROGRADE CHOLANGIOPANCREATOGRAPHY with sphincterotomy, cholangiogram, and balloon sweep and common bile duct (N/A ) Diagnosis:       Elevated LFTs      Calculus of bile duct without cholecystitis with obstruction      (Elevated LFTs [R79.89])      (Calculus of bile duct without cholecystitis with obstruction [K80.51])    Surgeons: Robin Mg MD Provider: Mihir Balderas MD    Anesthesia Type: MAC ASA Status: 3          Anesthesia Type: MAC    Vitals  Vitals Value Taken Time   /70 01/03/22 1455   Temp     Pulse 91 01/03/22 1455   Resp 14 01/03/22 1455   SpO2 95 % 01/03/22 1455           Post Anesthesia Care and Evaluation    Level of consciousness: awake  Pain management: adequate  Airway patency: patent  Anesthetic complications: No anesthetic complications  PONV Status: none  Cardiovascular status: acceptable  Respiratory status: acceptable  Hydration status: acceptable

## 2022-01-03 NOTE — BRIEF OP NOTE
ENDOSCOPIC RETROGRADE CHOLANGIOPANCREATOGRAPHY  Progress Note    Ivis Madrid  1/3/2022    Pre-op Diagnosis:   Elevated LFTs [R79.89]  Calculus of bile duct without cholecystitis with obstruction [K80.51]       Post-Op Diagnosis Codes:     * Elevated LFTs [R79.89]     * Calculus of bile duct without cholecystitis with obstruction [K80.51]    Procedure/CPT® Codes:        Procedure(s):  ENDOSCOPIC RETROGRADE CHOLANGIOPANCREATOGRAPHY with sphincterotomy, cholangiogram, and balloon sweep and common bile duct    Surgeon(s):  Robin Mg MD    Anesthesia: Monitored Anesthesia Care    Staff:   Endo Technician: Bailee Munoz RN; Bailee Ledezma  Endo Nurse: Tayler Varela RN         Estimated Blood Loss: minimal    Urine Voided: * No values recorded between 1/3/2022  2:12 PM and 1/3/2022  2:33 PM *    Specimens:                None          Drains: * No LDAs found *    Findings: ERCP with sphincterotomy balloon stone extraction revealed multiple small common duct stones and sludge removed after sphincterotomy and balloon sweep.  Patient tolerated well and sent to recovery.    Complications: None          Robin Mg MD     Date: 1/3/2022  Time: 14:35 EST

## 2022-01-04 VITALS
SYSTOLIC BLOOD PRESSURE: 105 MMHG | HEART RATE: 96 BPM | TEMPERATURE: 97.3 F | OXYGEN SATURATION: 93 % | RESPIRATION RATE: 16 BRPM | DIASTOLIC BLOOD PRESSURE: 62 MMHG

## 2022-01-04 PROBLEM — K80.20 CALCULUS OF GALLBLADDER WITHOUT CHOLECYSTITIS WITHOUT OBSTRUCTION: Status: ACTIVE | Noted: 2021-12-13

## 2022-01-04 LAB
ANION GAP SERPL CALCULATED.3IONS-SCNC: 10.7 MMOL/L (ref 5–15)
BUN SERPL-MCNC: 8 MG/DL (ref 6–20)
BUN/CREAT SERPL: 10.1 (ref 7–25)
CALCIUM SPEC-SCNC: 9.5 MG/DL (ref 8.6–10.5)
CHLORIDE SERPL-SCNC: 100 MMOL/L (ref 98–107)
CO2 SERPL-SCNC: 26.3 MMOL/L (ref 22–29)
CREAT SERPL-MCNC: 0.79 MG/DL (ref 0.57–1)
DEPRECATED RDW RBC AUTO: 42.5 FL (ref 37–54)
ERYTHROCYTE [DISTWIDTH] IN BLOOD BY AUTOMATED COUNT: 12.9 % (ref 12.3–15.4)
GFR SERPL CREATININE-BSD FRML MDRD: 90 ML/MIN/1.73
GLUCOSE SERPL-MCNC: 98 MG/DL (ref 65–99)
HCT VFR BLD AUTO: 38.5 % (ref 34–46.6)
HGB BLD-MCNC: 12.4 G/DL (ref 12–15.9)
LAB AP CASE REPORT: NORMAL
MCH RBC QN AUTO: 29.1 PG (ref 26.6–33)
MCHC RBC AUTO-ENTMCNC: 32.2 G/DL (ref 31.5–35.7)
MCV RBC AUTO: 90.4 FL (ref 79–97)
PATH REPORT.FINAL DX SPEC: NORMAL
PATH REPORT.GROSS SPEC: NORMAL
PLATELET # BLD AUTO: 304 10*3/MM3 (ref 140–450)
PMV BLD AUTO: 11.8 FL (ref 6–12)
POTASSIUM SERPL-SCNC: 3.8 MMOL/L (ref 3.5–5.2)
RBC # BLD AUTO: 4.26 10*6/MM3 (ref 3.77–5.28)
SODIUM SERPL-SCNC: 137 MMOL/L (ref 136–145)
WBC NRBC COR # BLD: 11.47 10*3/MM3 (ref 3.4–10.8)

## 2022-01-04 PROCEDURE — 85027 COMPLETE CBC AUTOMATED: CPT | Performed by: SURGERY

## 2022-01-04 PROCEDURE — 25010000002 INFLUENZA VAC SPLIT QUAD 0.5 ML SUSPENSION PREFILLED SYRINGE: Performed by: INTERNAL MEDICINE

## 2022-01-04 PROCEDURE — G0378 HOSPITAL OBSERVATION PER HR: HCPCS

## 2022-01-04 PROCEDURE — G0008 ADMIN INFLUENZA VIRUS VAC: HCPCS | Performed by: INTERNAL MEDICINE

## 2022-01-04 PROCEDURE — 90686 IIV4 VACC NO PRSV 0.5 ML IM: CPT | Performed by: INTERNAL MEDICINE

## 2022-01-04 PROCEDURE — 80048 BASIC METABOLIC PNL TOTAL CA: CPT | Performed by: SURGERY

## 2022-01-04 RX ORDER — OXYCODONE HYDROCHLORIDE AND ACETAMINOPHEN 5; 325 MG/1; MG/1
TABLET ORAL
Qty: 20 TABLET | Refills: 0 | Status: SHIPPED | OUTPATIENT
Start: 2022-01-04 | End: 2022-01-18

## 2022-01-04 RX ORDER — ONDANSETRON 4 MG/1
4 TABLET, FILM COATED ORAL EVERY 6 HOURS PRN
Qty: 10 TABLET | Refills: 0 | Status: SHIPPED | OUTPATIENT
Start: 2022-01-04 | End: 2022-01-18

## 2022-01-04 RX ADMIN — OXYCODONE AND ACETAMINOPHEN 1 TABLET: 5; 325 TABLET ORAL at 04:22

## 2022-01-04 RX ADMIN — PANTOPRAZOLE SODIUM 40 MG: 40 TABLET, DELAYED RELEASE ORAL at 06:03

## 2022-01-04 RX ADMIN — INFLUENZA VIRUS VACCINE 0.5 ML: 15; 15; 15; 15 SUSPENSION INTRAMUSCULAR at 09:37

## 2022-01-04 RX ADMIN — OXYCODONE AND ACETAMINOPHEN 1 TABLET: 5; 325 TABLET ORAL at 08:18

## 2022-01-04 NOTE — PROGRESS NOTES
Case Management Discharge Note      Final Note: Discharged home. Bozena Noble, JANEL         Transportation Services  Private: Car    Final Discharge Disposition Code: 01 - home or self-care

## 2022-01-04 NOTE — PLAN OF CARE
Goal Outcome Evaluation:  Plan of Care Reviewed With: patient, significant other        Progress: improving  Outcome Summary: vss, afebrile, lap sites x4 w/ steri-strips w/ dried drainage, Percocet given for pain control, denies nausea, ambulated in temple, SL, independent, room air, passing gas per pt report,regular low fat, tachycardiac at times

## 2022-01-18 ENCOUNTER — OFFICE VISIT (OUTPATIENT)
Dept: SURGERY | Facility: CLINIC | Age: 22
End: 2022-01-18

## 2022-01-18 VITALS — HEIGHT: 64 IN | BODY MASS INDEX: 38 KG/M2 | WEIGHT: 222.6 LBS

## 2022-01-18 DIAGNOSIS — Z48.89 POSTOPERATIVE VISIT: Primary | ICD-10-CM

## 2022-01-18 PROCEDURE — 99024 POSTOP FOLLOW-UP VISIT: CPT | Performed by: SURGERY

## 2022-01-19 ENCOUNTER — APPOINTMENT (OUTPATIENT)
Dept: PREADMISSION TESTING | Facility: HOSPITAL | Age: 22
End: 2022-01-19

## 2022-01-20 NOTE — PROGRESS NOTES
Subjective   Ivis Madrid is a 21 y.o. female who returns to the office after undergoing a laparoscopic cholecystectomy with intraoperative cholangiogram on 1/2/2022 followed by ERCP with CBD stone removal on 1/3/2022.     History of Present Illness     The patient is recovering well with no significant postop symptoms.  She is having no abdominal pain.  She has a good appetite and normal bowel function.  Her energy level is good.      Review of Systems   Constitutional: Negative for activity change, appetite change, fatigue and fever.   Respiratory: Negative for chest tightness and shortness of breath.    Cardiovascular: Negative for chest pain and palpitations.   Gastrointestinal: Negative for abdominal pain, constipation, diarrhea and nausea.   Skin: Negative for rash and wound.   Psychiatric/Behavioral: Negative for agitation and confusion.       Objective   Physical Exam  Constitutional:       General: She is not in acute distress.     Appearance: Normal appearance. She is not ill-appearing or toxic-appearing.   Pulmonary:      Effort: Pulmonary effort is normal. No respiratory distress.   Abdominal:      Palpations: Abdomen is soft.      Tenderness: There is no abdominal tenderness.   Skin:     Comments: Incision: intact with no evidence of infection.   Neurological:      Mental Status: She is alert.   Psychiatric:         Behavior: Behavior normal.         Assessment/Plan   The encounter diagnosis was Postoperative visit.    The patient is recovering well from her laparoscopic cholecystectomy with intraoperative cholangiogram.  At this point she has no limitations.  She will follow-up on an as-needed basis.

## 2024-08-07 ENCOUNTER — OFFICE VISIT (OUTPATIENT)
Dept: OBSTETRICS AND GYNECOLOGY | Age: 24
End: 2024-08-07
Payer: COMMERCIAL

## 2024-08-07 VITALS
SYSTOLIC BLOOD PRESSURE: 124 MMHG | HEIGHT: 64 IN | DIASTOLIC BLOOD PRESSURE: 76 MMHG | WEIGHT: 226 LBS | BODY MASS INDEX: 38.58 KG/M2

## 2024-08-07 DIAGNOSIS — Z34.81 ENCOUNTER FOR SUPERVISION OF OTHER NORMAL PREGNANCY IN FIRST TRIMESTER: Primary | ICD-10-CM

## 2024-08-07 DIAGNOSIS — R63.8 INCREASED BMI: ICD-10-CM

## 2024-08-07 PROBLEM — Z34.90 SUPERVISION OF NORMAL PREGNANCY: Status: ACTIVE | Noted: 2024-08-07

## 2024-08-07 RX ORDER — PRENATAL VIT/IRON FUM/FOLIC AC 27MG-0.8MG
TABLET ORAL DAILY
COMMUNITY

## 2024-08-07 NOTE — PROGRESS NOTES
New GYN Problem    Chief Complaint   Patient presents with    Gynecologic Exam     Pregnancy Confirmation LMP 24 with U/S       Ivis ALEC Madrid is a 24-year-old  at 6 weeks 5 days, history of PCOS, who presents with a new pregnancy.  She notes that her menses have been monthly and regular for the past at least 1 year.  She has been feeling well, some mild nausea and having to eat frequently in order to feel well.  She had previously been on OCPs for menstrual regulation but stopped them years ago as she was interested in pregnancy, but she did not want to pursue fertility treatments for PCOS  No cramping or bleeding  Boyfriend is Anant Patel    Histories  Past Medical History:   Diagnosis Date    Anxiety     Heart burn     PCOS (polycystic ovarian syndrome)     PMS (premenstrual syndrome) 2008 first period    Polycystic ovary syndrome     Rough estimate for age       Past Surgical History:   Procedure Laterality Date    CHOLECYSTECTOMY WITH INTRAOPERATIVE CHOLANGIOGRAM N/A 2022    Procedure: CHOLECYSTECTOMY LAPAROSCOPIC INTRAOPERATIVE CHOLANGIOGRAM;  Surgeon: Toni Villatoro Jr., MD;  Location: [facility] MAIN OR;  Service: General;  Laterality: N/A;    ERCP N/A 2022    Procedure: ENDOSCOPIC RETROGRADE CHOLANGIOPANCREATOGRAPHY with sphincterotomy, cholangiogram, and balloon sweep and common bile duct;  Surgeon: Robin Mg MD;  Location: [facility] ENDOSCOPY;  Service: Gastroenterology;  Laterality: N/A;  Pre op: Common Bile Duct Obstruction   Post op: CBD stones     INNER EAR SURGERY      KNEE SURGERY Right     LAPAROSCOPIC CHOLECYSTECTOMY      WISDOM TOOTH EXTRACTION  2016       Family History   Problem Relation Age of Onset    Diabetes Father     Arthritis Father     Arthritis Mother     Asthma Mother     Deep vein thrombosis Sister     Heart disease Paternal Grandfather     Colon cancer Neg Hx     Colon polyps Neg Hx     Crohn's disease Neg Hx     Irritable bowel syndrome Neg  "Hx     Ulcerative colitis Neg Hx     Breast cancer Neg Hx     Ovarian cancer Neg Hx     Uterine cancer Neg Hx     Congenital heart disease Neg Hx        Social History     Socioeconomic History    Marital status: Single   Tobacco Use    Smoking status: Never    Smokeless tobacco: Never   Vaping Use    Vaping status: Never Used   Substance and Sexual Activity    Alcohol use: Not Currently     Comment: OCC     Drug use: Not Currently     Frequency: 0.8 times per week     Types: Marijuana     Comment: Stopped once tested positive    Sexual activity: Yes     Partners: Male     Birth control/protection: None       OB History          1    Para   0    Term   0       0    AB   0    Living   0         SAB   0    IAB   0    Ectopic   0    Molar        Multiple   0    Live Births                    Physical Exam    /76   Ht 162.6 cm (64\")   Wt 103 kg (226 lb)   LMP 2024 (Exact Date)   BMI 38.79 kg/m²     BMI: Body mass index is 38.79 kg/m².     Well, no distress  Regular, nonlabored breathing    Transvaginal ultrasound with IUP 6-week 5-day, viable    Assessment/Plan    Diagnoses and all orders for this visit:    1. Encounter for supervision of other normal pregnancy in first trimester (Primary)  -     OB Panel With HIV and RPR  -     Varicella Zoster Antibody, IgG  -     Hemoglobin A1c  -     Hemoglobinopathy Fractionation Cascade  -     Urine Culture - Urine, Urine, Clean Catch  -     Drug Profile Urine - 9 Drugs - Urine, Clean Catch  -     Chlamydia trachomatis, Neisseria gonorrhoeae, Trichomonas vaginalis, PCR - Urine, Urine, Clean Catch    2. Increased BMI      Needs Pap at future visit  Reviewed call structure, delivery at [facility]  Prenatal folder given  Pregnancy counseling performed    Return for 3-4 weeks ob intake with genetics and pap.      Tressa Montgomery MD  2024  17:12 EDT              "

## 2024-08-08 LAB
ABO GROUP BLD: ABNORMAL
BASOPHILS # BLD AUTO: 0 X10E3/UL (ref 0–0.2)
BASOPHILS NFR BLD AUTO: 0 %
BLD GP AB SCN SERPL QL: NEGATIVE
EOSINOPHIL # BLD AUTO: 0.1 X10E3/UL (ref 0–0.4)
EOSINOPHIL NFR BLD AUTO: 1 %
ERYTHROCYTE [DISTWIDTH] IN BLOOD BY AUTOMATED COUNT: 13.2 % (ref 11.7–15.4)
HBA1C MFR BLD: 5.5 % (ref 4.8–5.6)
HBV SURFACE AG SERPL QL IA: NEGATIVE
HCT VFR BLD AUTO: 39.9 % (ref 34–46.6)
HCV AB SERPL QL IA: NORMAL
HCV IGG SERPL QL IA: NON REACTIVE
HGB A MFR BLD ELPH: 97.6 % (ref 96.4–98.8)
HGB A2 MFR BLD ELPH: 2.4 % (ref 1.8–3.2)
HGB BLD-MCNC: 13.1 G/DL (ref 11.1–15.9)
HGB F MFR BLD ELPH: 0 % (ref 0–2)
HGB FRACT BLD-IMP: NORMAL
HGB S MFR BLD ELPH: 0 %
HIV 1+2 AB+HIV1 P24 AG SERPL QL IA: NON REACTIVE
IMM GRANULOCYTES # BLD AUTO: 0 X10E3/UL (ref 0–0.1)
IMM GRANULOCYTES NFR BLD AUTO: 0 %
LYMPHOCYTES # BLD AUTO: 1.9 X10E3/UL (ref 0.7–3.1)
LYMPHOCYTES NFR BLD AUTO: 19 %
MCH RBC QN AUTO: 30 PG (ref 26.6–33)
MCHC RBC AUTO-ENTMCNC: 32.8 G/DL (ref 31.5–35.7)
MCV RBC AUTO: 91 FL (ref 79–97)
MONOCYTES # BLD AUTO: 0.7 X10E3/UL (ref 0.1–0.9)
MONOCYTES NFR BLD AUTO: 7 %
NEUTROPHILS # BLD AUTO: 7.2 X10E3/UL (ref 1.4–7)
NEUTROPHILS NFR BLD AUTO: 73 %
PLATELET # BLD AUTO: 297 X10E3/UL (ref 150–450)
RBC # BLD AUTO: 4.37 X10E6/UL (ref 3.77–5.28)
RH BLD: POSITIVE
RPR SER QL: NON REACTIVE
RUBV IGG SERPL IA-ACNC: 1.17 INDEX
VZV IGG SER IA-ACNC: 673 INDEX
WBC # BLD AUTO: 10 X10E3/UL (ref 3.4–10.8)

## 2024-08-09 ENCOUNTER — TELEPHONE (OUTPATIENT)
Dept: OBSTETRICS AND GYNECOLOGY | Age: 24
End: 2024-08-09
Payer: COMMERCIAL

## 2024-08-09 DIAGNOSIS — O21.9 NAUSEA/VOMITING IN PREGNANCY: Primary | ICD-10-CM

## 2024-08-09 RX ORDER — PROMETHAZINE HYDROCHLORIDE 25 MG/1
25 TABLET ORAL EVERY 6 HOURS PRN
Qty: 30 TABLET | Refills: 0 | Status: SHIPPED | OUTPATIENT
Start: 2024-08-09

## 2024-08-09 NOTE — TELEPHONE ENCOUNTER
Since Wednesday unable to keep little food or fluids down, advised pt to try gatoraid in small amounts and if still can't keep down, need to go to Er.Pt requesting med for nausea

## 2024-08-10 LAB
AMPHETAMINES UR QL SCN: NEGATIVE NG/ML
BACTERIA UR CULT: NORMAL
BACTERIA UR CULT: NORMAL
BARBITURATES UR QL SCN: NEGATIVE NG/ML
BENZODIAZ UR QL: NEGATIVE NG/ML
BZE UR QL: NEGATIVE NG/ML
C TRACH RRNA SPEC QL NAA+PROBE: NEGATIVE
CARBOXYTHC UR QL CFM: POSITIVE
Lab: NORMAL
METHADONE UR QL SCN: NEGATIVE NG/ML
N GONORRHOEA RRNA SPEC QL NAA+PROBE: NEGATIVE
OPIATES UR QL: NEGATIVE NG/ML
PCP UR QL SCN: NEGATIVE NG/ML
PROPOXYPH UR QL SCN: NEGATIVE NG/ML
T VAGINALIS RRNA SPEC QL NAA+PROBE: NEGATIVE

## 2024-08-19 ENCOUNTER — TELEPHONE (OUTPATIENT)
Dept: OBSTETRICS AND GYNECOLOGY | Age: 24
End: 2024-08-19
Payer: COMMERCIAL

## 2024-08-19 RX ORDER — ONDANSETRON 4 MG/1
4 TABLET, FILM COATED ORAL EVERY 6 HOURS PRN
Qty: 30 TABLET | Refills: 1 | Status: SHIPPED | OUTPATIENT
Start: 2024-08-19 | End: 2025-08-19

## 2024-09-04 ENCOUNTER — ROUTINE PRENATAL (OUTPATIENT)
Dept: OBSTETRICS AND GYNECOLOGY | Age: 24
End: 2024-09-04
Payer: COMMERCIAL

## 2024-09-04 VITALS — DIASTOLIC BLOOD PRESSURE: 74 MMHG | WEIGHT: 216 LBS | BODY MASS INDEX: 37.08 KG/M2 | SYSTOLIC BLOOD PRESSURE: 122 MMHG

## 2024-09-04 DIAGNOSIS — Z01.419 ENCOUNTER FOR GYNECOLOGICAL EXAMINATION WITHOUT ABNORMAL FINDING: Primary | ICD-10-CM

## 2024-09-04 DIAGNOSIS — Z13.89 SCREENING FOR BLOOD OR PROTEIN IN URINE: ICD-10-CM

## 2024-09-04 DIAGNOSIS — Z34.81 ENCOUNTER FOR SUPERVISION OF OTHER NORMAL PREGNANCY IN FIRST TRIMESTER: ICD-10-CM

## 2024-09-04 LAB
GLUCOSE UR STRIP-MCNC: NEGATIVE MG/DL
PROT UR STRIP-MCNC: NEGATIVE MG/DL

## 2024-09-04 NOTE — PROGRESS NOTES
Ivis Madrid, a 24 y.o.  at 10w5d, presents for OB follow-up.  She is doing well today. She has on and off nausea, zofran helps but tries to take it sparingly    Objective  BP Readings from Last 3 Encounters:   24 122/74   24 124/76   22 105/62      Wt Readings from Last 3 Encounters:   24 98 kg (216 lb)   24 103 kg (226 lb)   22 101 kg (222 lb 9.6 oz)      Total weight gain this pregnancy: 0 kg (0 lb)    General: Awake, alert, no apparent distress  Respiratory: No increased work of breathing  Abdomen:  soft and nontender  Extremity: Nontender, no edema  Cervix normal and closed    A/P  Diagnoses and all orders for this visit:    1. Encounter for gynecological examination without abnormal finding (Primary)  -     IGP, Rfx Aptima HPV ASCU    2. Encounter for supervision of other normal pregnancy in first trimester  -     Inheritest 14-gene Panel - Blood,  -     [id number] PLUS Core+SCA+ESS - Blood,    3. Screening for blood or protein in urine  -     POC Urinalysis Dipstick      Follow up in four weeks    Tressa Montgomery MD

## 2024-09-06 LAB
CONV .: NORMAL
CYTOLOGIST CVX/VAG CYTO: NORMAL
CYTOLOGY CVX/VAG DOC CYTO: NORMAL
CYTOLOGY CVX/VAG DOC THIN PREP: NORMAL
DX ICD CODE: NORMAL
Lab: NORMAL
OTHER STN SPEC: NORMAL
STAT OF ADQ CVX/VAG CYTO-IMP: NORMAL

## 2024-09-06 NOTE — PROGRESS NOTES
Please notify that her Pap was normal, there were no abnormal cells found.    Tressa Montgomery MD  9/6/2024  09:58 EDT   Detail Level: Detailed Additional Notes: He feels he is about 125 lb.  He is not on a regimen x months, did not see in FOLLOWUP last year as directed on minocycline. Render Risk Assessment In Note?: no

## 2024-09-09 ENCOUNTER — TELEPHONE (OUTPATIENT)
Dept: OBSTETRICS AND GYNECOLOGY | Age: 24
End: 2024-09-09
Payer: COMMERCIAL

## 2024-09-09 NOTE — TELEPHONE ENCOUNTER
Please notify that her baby's genetic screening is low risk. Desires gender envelope, is a girl    Tressa Montgomery MD  9/9/2024  11:56 EDT

## 2024-10-02 ENCOUNTER — ROUTINE PRENATAL (OUTPATIENT)
Dept: OBSTETRICS AND GYNECOLOGY | Age: 24
End: 2024-10-02
Payer: COMMERCIAL

## 2024-10-02 VITALS — SYSTOLIC BLOOD PRESSURE: 126 MMHG | DIASTOLIC BLOOD PRESSURE: 70 MMHG | BODY MASS INDEX: 36.94 KG/M2 | WEIGHT: 215.2 LBS

## 2024-10-02 DIAGNOSIS — Z13.89 SCREENING FOR BLOOD OR PROTEIN IN URINE: Primary | ICD-10-CM

## 2024-10-02 DIAGNOSIS — Z34.02 ENCOUNTER FOR SUPERVISION OF NORMAL FIRST PREGNANCY IN SECOND TRIMESTER: ICD-10-CM

## 2024-10-02 LAB
GLUCOSE UR STRIP-MCNC: NEGATIVE MG/DL
PROT UR STRIP-MCNC: NEGATIVE MG/DL

## 2024-10-02 NOTE — PROGRESS NOTES
Ivis Madrid, a 24 y.o.  at 14w5d, presents for OB follow-up.  She is doing well today.  N/V improved. No cramping or bleeding    Objective  BP Readings from Last 3 Encounters:   10/02/24 126/70   24 122/74   24 124/76      Wt Readings from Last 3 Encounters:   10/02/24 97.6 kg (215 lb 3.2 oz)   24 98 kg (216 lb)   24 103 kg (226 lb)      Total weight gain this pregnancy: -0.363 kg (-12.8 oz)    General: Awake, alert, no apparent distress  Respiratory: No increased work of breathing  Abdomen: Fundus soft and nontender  Extremity: Nontender, no edema    A/P  Diagnoses and all orders for this visit:    1. Screening for blood or protein in urine (Primary)  -     POC Urinalysis Dipstick    2. Encounter for supervision of normal first pregnancy in second trimester      Return for four weeks with anatomy screen please.    Tressa Montgomery MD

## 2024-10-30 ENCOUNTER — ROUTINE PRENATAL (OUTPATIENT)
Dept: OBSTETRICS AND GYNECOLOGY | Age: 24
End: 2024-10-30
Payer: COMMERCIAL

## 2024-10-30 VITALS — WEIGHT: 217 LBS | DIASTOLIC BLOOD PRESSURE: 68 MMHG | BODY MASS INDEX: 37.25 KG/M2 | SYSTOLIC BLOOD PRESSURE: 110 MMHG

## 2024-10-30 DIAGNOSIS — Z34.02 ENCOUNTER FOR SUPERVISION OF NORMAL FIRST PREGNANCY IN SECOND TRIMESTER: ICD-10-CM

## 2024-10-30 DIAGNOSIS — Z13.89 SCREENING FOR BLOOD OR PROTEIN IN URINE: Primary | ICD-10-CM

## 2024-10-30 LAB
GLUCOSE UR STRIP-MCNC: NEGATIVE MG/DL
PROT UR STRIP-MCNC: ABNORMAL MG/DL

## 2024-10-30 NOTE — PROGRESS NOTES
Ivis Madrid, a 24 y.o.  at 18w5d, presents for OB follow-up.  She is doing well today.  Denies loss of fluid, vaginal bleeding, and contractions.  Endorses fetal movements.    Objective  BP Readings from Last 3 Encounters:   10/30/24 110/68   10/02/24 126/70   24 122/74      Wt Readings from Last 3 Encounters:   10/30/24 98.4 kg (217 lb)   10/02/24 97.6 kg (215 lb 3.2 oz)   24 98 kg (216 lb)      Total weight gain this pregnancy: 0.454 kg (1 lb)    General: Awake, alert, no apparent distress  Respiratory: No increased work of breathing  Abdomen: Fundus soft and nontender  Extremity: Nontender, no edema    A/P  Diagnoses and all orders for this visit:    1. Screening for blood or protein in urine (Primary)  -     POC Urinalysis Dipstick    2. Encounter for supervision of normal first pregnancy in second trimester  -     Alpha Fetoprotein, Maternal     Normal but incomplete anatomy  AFP screen    Return for four weeks with repeat anatomy screen please.    Tressa Montgomery MD

## 2024-11-01 ENCOUNTER — TELEPHONE (OUTPATIENT)
Dept: OBSTETRICS AND GYNECOLOGY | Age: 24
End: 2024-11-01
Payer: COMMERCIAL

## 2024-11-01 LAB
AFP INTERP SERPL-IMP: NORMAL
AFP INTERP SERPL-IMP: NORMAL
AFP MOM SERPL: 0.98
AFP SERPL-MCNC: 36.6 NG/ML
AGE AT DELIVERY: 27.1 YR
GA METHOD: NORMAL
GA: 18.7 WEEKS
IDDM PATIENT QL: NO
LABORATORY COMMENT REPORT: NORMAL
MULTIPLE PREGNANCY: NO
NEURAL TUBE DEFECT RISK FETUS: NORMAL %
RESULT: NORMAL

## 2024-11-25 ENCOUNTER — TELEPHONE (OUTPATIENT)
Dept: OBSTETRICS AND GYNECOLOGY | Age: 24
End: 2024-11-25

## 2024-11-25 ENCOUNTER — ROUTINE PRENATAL (OUTPATIENT)
Dept: OBSTETRICS AND GYNECOLOGY | Age: 24
End: 2024-11-25
Payer: COMMERCIAL

## 2024-11-25 VITALS — SYSTOLIC BLOOD PRESSURE: 118 MMHG | BODY MASS INDEX: 38.28 KG/M2 | WEIGHT: 223 LBS | DIASTOLIC BLOOD PRESSURE: 68 MMHG

## 2024-11-25 DIAGNOSIS — Z3A.22 22 WEEKS GESTATION OF PREGNANCY: Primary | ICD-10-CM

## 2024-11-25 DIAGNOSIS — O43.123 VELAMENTOUS INSERTION OF UMBILICAL CORD IN THIRD TRIMESTER: ICD-10-CM

## 2024-11-25 PROBLEM — R79.89 ELEVATED LFTS: Status: RESOLVED | Noted: 2022-01-01 | Resolved: 2024-11-25

## 2024-11-25 PROBLEM — K80.51 CALCULUS OF BILE DUCT WITHOUT CHOLECYSTITIS WITH OBSTRUCTION: Status: RESOLVED | Noted: 2022-01-01 | Resolved: 2024-11-25

## 2024-11-25 PROBLEM — K80.65 CALCULUS OF GALLBLADDER AND BILE DUCT WITH CHRONIC CHOLECYSTITIS WITH OBSTRUCTION: Status: RESOLVED | Noted: 2022-01-01 | Resolved: 2024-11-25

## 2024-11-25 LAB
GLUCOSE UR STRIP-MCNC: NEGATIVE MG/DL
PROT UR STRIP-MCNC: ABNORMAL MG/DL

## 2024-11-25 PROCEDURE — 0502F SUBSEQUENT PRENATAL CARE: CPT | Performed by: OBSTETRICS & GYNECOLOGY

## 2024-11-25 NOTE — TELEPHONE ENCOUNTER
"Provider: Tressa Montgomery MD     Caller: Ivis Madrid \"Ivis\"  Female, 24 y.o., 2000  MRN: [id number]  CSN: [id number]  Phone: [phone]    Relationship to Patient: SELF          Reason for Call: PT REQUESTING VISIT NOTE FROM 11-25-24 TO BE SENT TO HER MYCHART IF ABLE      "

## 2024-11-26 NOTE — PROGRESS NOTES
Ivis Madrid, a 24 y.o.  at 22w4d, presents for OB follow-up.  She is doing well today.  Denies loss of fluid, vaginal bleeding, and contractions.  Endorses fetal movements.    Objective  BP Readings from Last 3 Encounters:   24 118/68   10/30/24 110/68   10/02/24 126/70      Wt Readings from Last 3 Encounters:   24 101 kg (223 lb)   10/30/24 98.4 kg (217 lb)   10/02/24 97.6 kg (215 lb 3.2 oz)      Total weight gain this pregnancy: 3.175 kg (7 lb)    General: Awake, alert, no apparent distress  Respiratory: No increased work of breathing  Abdomen: Fundus soft and nontender  Extremity: Nontender, no edema    A/P  Diagnoses and all orders for this visit:    1. 22 weeks gestation of pregnancy (Primary)  -     POC Urinalysis Dipstick    2. Velamentous insertion of umbilical cord in third trimester  -     Ambulatory Referral to MFM/Perinatology      She has a normal and complete anatomy screen except that there is a marginal cord insertion, and the cord insertion appears possibly velamentous.  I recommended a level 2 ultrasound with maternal-fetal medicine to determine if she may have a velamentous cord insertion.      Return for four weeks ob follow up w ith glucose challenge please.    Tressa Montgomery MD

## 2024-12-19 ENCOUNTER — TRANSCRIBE ORDERS (OUTPATIENT)
Dept: ULTRASOUND IMAGING | Facility: HOSPITAL | Age: 24
End: 2024-12-19
Payer: COMMERCIAL

## 2024-12-19 DIAGNOSIS — O28.3 ABNORMAL FETAL ULTRASOUND: Primary | ICD-10-CM

## 2024-12-23 ENCOUNTER — ROUTINE PRENATAL (OUTPATIENT)
Dept: OBSTETRICS AND GYNECOLOGY | Age: 24
End: 2024-12-23
Payer: COMMERCIAL

## 2024-12-23 VITALS — BODY MASS INDEX: 38.76 KG/M2 | WEIGHT: 225.8 LBS | SYSTOLIC BLOOD PRESSURE: 124 MMHG | DIASTOLIC BLOOD PRESSURE: 72 MMHG

## 2024-12-23 DIAGNOSIS — Z13.1 SCREENING FOR DIABETES MELLITUS: ICD-10-CM

## 2024-12-23 DIAGNOSIS — Z34.82 ENCOUNTER FOR SUPERVISION OF OTHER NORMAL PREGNANCY IN SECOND TRIMESTER: ICD-10-CM

## 2024-12-23 DIAGNOSIS — Z13.89 SCREENING FOR BLOOD OR PROTEIN IN URINE: Primary | ICD-10-CM

## 2024-12-23 DIAGNOSIS — Z13.0 SCREENING FOR IRON DEFICIENCY ANEMIA: ICD-10-CM

## 2024-12-23 LAB
GLUCOSE UR STRIP-MCNC: NEGATIVE MG/DL
PROT UR STRIP-MCNC: NEGATIVE MG/DL

## 2024-12-23 NOTE — PROGRESS NOTES
Ivis Madrid, a 24 y.o.  at 26w3d, presents for OB follow-up.  She is doing well today.  Denies loss of fluid, vaginal bleeding, and contractions.  Endorses fetal movements.    Objective  BP Readings from Last 3 Encounters:   24 124/72   24 118/68   10/30/24 110/68      Wt Readings from Last 3 Encounters:   24 102 kg (225 lb 12.8 oz)   24 101 kg (223 lb)   10/30/24 98.4 kg (217 lb)      Total weight gain this pregnancy: 4.445 kg (9 lb 12.8 oz)    General: Awake, alert, no apparent distress  Respiratory: No increased work of breathing  Abdomen: Fundus soft and nontender  Extremity: Nontender, no edema    A/P  Diagnoses and all orders for this visit:    1. Screening for blood or protein in urine (Primary)  -     POC Urinalysis Dipstick    2. Encounter for supervision of other normal pregnancy in second trimester  -     Treponema pallidum AB w/Reflex RPR    3. Screening for iron deficiency anemia  -     CBC & Differential    4. Screening for diabetes mellitus  -     Gestational Screen 1 Hr ([facility])    Mirena PP  Plans to breastfeed  Discussed signing up for classes    Return for three weeks then every 2 wks.    Tressa Montgomery MD

## 2024-12-24 LAB
BASOPHILS # BLD AUTO: 0 X10E3/UL (ref 0–0.2)
BASOPHILS NFR BLD AUTO: 0 %
EOSINOPHIL # BLD AUTO: 0.1 X10E3/UL (ref 0–0.4)
EOSINOPHIL NFR BLD AUTO: 1 %
ERYTHROCYTE [DISTWIDTH] IN BLOOD BY AUTOMATED COUNT: 11.9 % (ref 11.7–15.4)
GLUCOSE 1H P 50 G GLC PO SERPL-MCNC: 117 MG/DL (ref 70–139)
HCT VFR BLD AUTO: 33.8 % (ref 34–46.6)
HGB BLD-MCNC: 11.1 G/DL (ref 11.1–15.9)
IMM GRANULOCYTES # BLD AUTO: 0 X10E3/UL (ref 0–0.1)
IMM GRANULOCYTES NFR BLD AUTO: 0 %
LYMPHOCYTES # BLD AUTO: 1.8 X10E3/UL (ref 0.7–3.1)
LYMPHOCYTES NFR BLD AUTO: 18 %
MCH RBC QN AUTO: 30.2 PG (ref 26.6–33)
MCHC RBC AUTO-ENTMCNC: 32.8 G/DL (ref 31.5–35.7)
MCV RBC AUTO: 92 FL (ref 79–97)
MONOCYTES # BLD AUTO: 0.6 X10E3/UL (ref 0.1–0.9)
MONOCYTES NFR BLD AUTO: 6 %
NEUTROPHILS # BLD AUTO: 7.8 X10E3/UL (ref 1.4–7)
NEUTROPHILS NFR BLD AUTO: 75 %
PLATELET # BLD AUTO: 295 X10E3/UL (ref 150–450)
RBC # BLD AUTO: 3.67 X10E6/UL (ref 3.77–5.28)
TREPONEMA PALLIDUM IGG+IGM AB [PRESENCE] IN SERUM OR PLASMA BY IMMUNOASSAY: NON REACTIVE
WBC # BLD AUTO: 10.4 X10E3/UL (ref 3.4–10.8)

## 2024-12-27 ENCOUNTER — HOSPITAL ENCOUNTER (OUTPATIENT)
Dept: ULTRASOUND IMAGING | Facility: HOSPITAL | Age: 24
Discharge: HOME | End: 2024-12-27
Admitting: OBSTETRICS & GYNECOLOGY
Payer: COMMERCIAL

## 2024-12-27 ENCOUNTER — OFFICE VISIT (OUTPATIENT)
Dept: OBSTETRICS AND GYNECOLOGY | Facility: CLINIC | Age: 24
End: 2024-12-27
Payer: COMMERCIAL

## 2024-12-27 VITALS — HEART RATE: 114 BPM | SYSTOLIC BLOOD PRESSURE: 107 MMHG | TEMPERATURE: 97.8 F | DIASTOLIC BLOOD PRESSURE: 55 MMHG

## 2024-12-27 DIAGNOSIS — O28.3 ABNORMAL FETAL ULTRASOUND: ICD-10-CM

## 2024-12-27 DIAGNOSIS — R63.8 INCREASED BMI: Primary | ICD-10-CM

## 2024-12-27 DIAGNOSIS — O43.123 VELAMENTOUS INSERTION OF UMBILICAL CORD IN THIRD TRIMESTER: ICD-10-CM

## 2024-12-27 PROCEDURE — 76811 OB US DETAILED SNGL FETUS: CPT

## 2024-12-27 PROCEDURE — 76819 FETAL BIOPHYS PROFIL W/O NST: CPT

## 2024-12-27 NOTE — PROGRESS NOTES
Pt reports that she is doing well and denies vaginal bleeding, cramping, contractions or LOF at this time. Reports active fetal movement. Reviewed when to call OB office or present to L&D for evaluation with symptoms such as decreased fetal movement, vaginal bleeding, LOF or ctxs. Pt verbalized understanding. Denies HA, visual changes or epigastric pain. Denies any additional complaints at time of appointment. Next OB appointment scheduled for 1/16.    Vitals:    12/27/24 0837   BP: 107/55   Pulse: 114   Temp: 97.8 °F (36.6 °C)

## 2024-12-27 NOTE — PROGRESS NOTES
MATERNAL FETAL MEDICINE Consult Note    Dear Dr Tressa Montgomery MD:    Thank you for your kind referral of Ivis Madrid.  As you know, she is a 24 y.o.   27w0d gestation (Estimated Date of Delivery: 3/28/25). This is a consult.      Her antepartum course is complicated by:  - Abnormal placental cord insertion, velamentous vs marginal    Aneuploidy Screening:  Carrier Screening:    HPI: Today, she denies headache, blurry vision, RUQ pain. No vaginal bleeding, no contractions.  She reports excellent fetal movement.    Review of History:  Past Medical History:   Diagnosis Date    Anxiety     Heart burn     PCOS (polycystic ovarian syndrome)     PMS (premenstrual syndrome) 2008 first period    Polycystic ovary syndrome     Rough estimate for age     Past Surgical History:   Procedure Laterality Date    CHOLECYSTECTOMY WITH INTRAOPERATIVE CHOLANGIOGRAM N/A 2022    Procedure: CHOLECYSTECTOMY LAPAROSCOPIC INTRAOPERATIVE CHOLANGIOGRAM;  Surgeon: Toni Villatoro Jr., MD;  Location: [facility] OR;  Service: General;  Laterality: N/A;    ERCP N/A 2022    Procedure: ENDOSCOPIC RETROGRADE CHOLANGIOPANCREATOGRAPHY with sphincterotomy, cholangiogram, and balloon sweep and common bile duct;  Surgeon: Robin Mg MD;  Location: [facility] ENDOSCOPY;  Service: Gastroenterology;  Laterality: N/A;  Pre op: Common Bile Duct Obstruction   Post op: CBD stones     INNER EAR SURGERY      KNEE SURGERY Right     LAPAROSCOPIC CHOLECYSTECTOMY      WISDOM TOOTH EXTRACTION  2016       OB Hx:    Social History     Socioeconomic History    Marital status: Single   Tobacco Use    Smoking status: Never    Smokeless tobacco: Never   Vaping Use    Vaping status: Never Used   Substance and Sexual Activity    Alcohol use: Not Currently     Comment: OCC     Drug use: Not Currently     Frequency: 0.8 times per week     Types: Marijuana     Comment: Stopped once tested positive    Sexual activity: Yes     Partners:  Male     Birth control/protection: None     Family History   Problem Relation Age of Onset    Diabetes Father     Arthritis Father     Arthritis Mother     Asthma Mother     Deep vein thrombosis Sister     Heart disease Paternal Grandfather     Colon cancer Neg Hx     Colon polyps Neg Hx     Crohn's disease Neg Hx     Irritable bowel syndrome Neg Hx     Ulcerative colitis Neg Hx     Breast cancer Neg Hx     Ovarian cancer Neg Hx     Uterine cancer Neg Hx     Congenital heart disease Neg Hx       Allergies   Allergen Reactions    Sulfa Antibiotics Hives    Sulfamethoxazole-Trimethoprim Hives and Unknown - Low Severity      Current Outpatient Medications on File Prior to Visit   Medication Sig Dispense Refill    Prenatal Vit-Fe Fumarate-FA (prenatal vitamin 27-0.8) 27-0.8 MG tablet tablet Take  by mouth Daily.      ondansetron (Zofran) 4 MG tablet Take 1 tablet by mouth Every 6 (Six) Hours As Needed for Nausea or Vomiting. 30 tablet 1    promethazine (PHENERGAN) 25 MG tablet Take 1 tablet by mouth Every 6 (Six) Hours As Needed for Nausea or Vomiting. 30 tablet 0     No current facility-administered medications on file prior to visit.        Past obstetric, gynecological, medical, surgical, family and social history reviewed.  Relevant lab work and imaging reviewed.    Review of systems  Constitutional:  denies fever, chills, malaise.   ENT/Mouth:  denies sore throat, tinnitus  Eyes: denies vision changes/pain  CV:  denies chest pain  Respiratory:  denies cough/SOB  GI:  denies N/V, diarrhea, abdominal pain.    :   denies dysuria  Skin:  denies lesions or pruritus   Neuro:  denies weakness, focal neurologic symptoms    Vitals:    12/27/24 0837   BP: 107/55   BP Location: Right arm   Patient Position: Sitting   Pulse: 114   Temp: 97.8 °F (36.6 °C)   TempSrc: Temporal       PHYSICAL EXAM   GENERAL: Not in acute distress, AAOx3, pleasant    LABS:   [id number] - low risk female      ULTRASOUND   Please view full  ultrasound note on Imaging tab in ViewPoint.  - Cephalic, normal cardiac activity and amniotic fluid volume.  - Posterior placenta with a marginal to velamentous cord insertion  - Anatomy appears normal with the exception of suboptimal DA  - EFW 1181 grams (2lb 10oz) 69%    ASSESSMENT/COUNSELIN y.o.   27w0d gestation (Estimated Date of Delivery: 3/28/25).  Today's ultrasound shows a placenta that is posterior and a placental cord insertion that is at the placental edge.  The placental cord insertion is in the fundal region and well away from the cervix.  There is no extended distance between the cord insertion and placental edge.    The baby is appropriately grown; in fact, the abdominal circumference is the largest measurement.  Therefore there is no evidence of uteroplacental insufficiency or chronic cord compression.  The findings were discussed with the patient and her significant other.  Some studies have reported marginal cord insertion is to be associated with an increased risk for growth restriction.  For Mrs. Madrid, the insertion site is well away from the lower uterine segment.  I suspect there will be low probability of chronic or repeated compression.  Nevertheless, serial growth scans are indicated.  In general, these can be conducted with the primary OB.  However, I am asking her to return here in 5 weeks so that we can attempt to evaluate the ductus arteriosus which was inadequately seen during our anatomical survey today.       -Pregnancy  [ X ] stable  [   ] improving [  ] worsening    Diagnoses and all orders for this visit:    1. Increased BMI (Primary)    2. Velamentous insertion of umbilical cord in third trimester      Summary of Plan  -Maternal-fetal medicine follow-up in 5 weeks for check ductus arteriosus, growth and BPP.  If that evaluation is normal, further maternal-fetal medicine follow-up will not be required.  -Given the patient's BMI, growth should be checked every 4 to 6  weeks throughout the third trimester  -Starting at 28 weeks: Fetal movement instructions given continue daily until delivery; instructed to report to labor and delivery if cannot achieve more than 10 kicks in 2 hours or if she perceives a decrease in fetal movement  -Weekly biophysical profiles or nonstress test plus PATRICK beginning at 36 weeks (BMI) - with primary OB    Follow-up: No follow up with MFM scheduled, but I am happy to see for follow up at request of primary obstetrician    Thank you for the consult and opportunity to care for this patient.  Please feel free to reach out with any questions or concerns.      I spent 45 minutes caring for this patient on this date of service. This time includes time spent by me in the following activities: preparing for the visit, reviewing tests, obtaining and/or reviewing a separately obtained history, performing a medically appropriate examination and/or evaluation, counseling and educating the patient/family/caregiver and independently interpreting results and communicating that information with the patient/family/caregiver with greater than 50% spent in counseling and coordination of care. This time did NOT include time for interpretation of imaging or electronic fetal monitoring.     Chalo Handy MD FACOG  Maternal Fetal Medicine-[facility]  Office: [phone]  [email or web address]

## 2024-12-27 NOTE — LETTER
2024     Tressa Montgomery MD  [address]  [address]  [place] [id number]    Patient: Ivis Madrid   YOB: 2000   Date of Visit: 2024       Dear Tressa Montgomery MD    Ivis Madrid was in my office today. Below is a copy of my note.    If you have questions, please do not hesitate to call me. I look forward to following Ivis along with you.         Sincerely,        Chalo Handy MD        CC: No Recipients    MATERNAL FETAL MEDICINE Consult Note    Dear Dr Tressa Montgomery MD:    Thank you for your kind referral of Ivis Madrid.  As you know, she is a 24 y.o.   27w0d gestation (Estimated Date of Delivery: 3/28/25). This is a consult.      Her antepartum course is complicated by:  - Abnormal placental cord insertion, velamentous vs marginal    Aneuploidy Screening:  Carrier Screening:    HPI: Today, she denies headache, blurry vision, RUQ pain. No vaginal bleeding, no contractions.  She reports excellent fetal movement.    Review of History:  Past Medical History:   Diagnosis Date   • Anxiety    • Heart burn    • PCOS (polycystic ovarian syndrome)    • PMS (premenstrual syndrome) 2008 first period   • Polycystic ovary syndrome     Rough estimate for age     Past Surgical History:   Procedure Laterality Date   • CHOLECYSTECTOMY WITH INTRAOPERATIVE CHOLANGIOGRAM N/A 2022    Procedure: CHOLECYSTECTOMY LAPAROSCOPIC INTRAOPERATIVE CHOLANGIOGRAM;  Surgeon: Toni Villatoro Jr., MD;  Location: [facility] OR;  Service: General;  Laterality: N/A;   • ERCP N/A 2022    Procedure: ENDOSCOPIC RETROGRADE CHOLANGIOPANCREATOGRAPHY with sphincterotomy, cholangiogram, and balloon sweep and common bile duct;  Surgeon: Robin Mg MD;  Location: [facility] ENDOSCOPY;  Service: Gastroenterology;  Laterality: N/A;  Pre op: Common Bile Duct Obstruction   Post op: CBD stones    • INNER EAR SURGERY     • KNEE SURGERY Right    • LAPAROSCOPIC CHOLECYSTECTOMY   2022   • WISDOM TOOTH EXTRACTION  2016       OB Hx:    Social History     Socioeconomic History   • Marital status: Single   Tobacco Use   • Smoking status: Never   • Smokeless tobacco: Never   Vaping Use   • Vaping status: Never Used   Substance and Sexual Activity   • Alcohol use: Not Currently     Comment: OCC    • Drug use: Not Currently     Frequency: 0.8 times per week     Types: Marijuana     Comment: Stopped once tested positive   • Sexual activity: Yes     Partners: Male     Birth control/protection: None     Family History   Problem Relation Age of Onset   • Diabetes Father    • Arthritis Father    • Arthritis Mother    • Asthma Mother    • Deep vein thrombosis Sister    • Heart disease Paternal Grandfather    • Colon cancer Neg Hx    • Colon polyps Neg Hx    • Crohn's disease Neg Hx    • Irritable bowel syndrome Neg Hx    • Ulcerative colitis Neg Hx    • Breast cancer Neg Hx    • Ovarian cancer Neg Hx    • Uterine cancer Neg Hx    • Congenital heart disease Neg Hx       Allergies   Allergen Reactions   • Sulfa Antibiotics Hives   • Sulfamethoxazole-Trimethoprim Hives and Unknown - Low Severity      Current Outpatient Medications on File Prior to Visit   Medication Sig Dispense Refill   • Prenatal Vit-Fe Fumarate-FA (prenatal vitamin 27-0.8) 27-0.8 MG tablet tablet Take  by mouth Daily.     • ondansetron (Zofran) 4 MG tablet Take 1 tablet by mouth Every 6 (Six) Hours As Needed for Nausea or Vomiting. 30 tablet 1   • promethazine (PHENERGAN) 25 MG tablet Take 1 tablet by mouth Every 6 (Six) Hours As Needed for Nausea or Vomiting. 30 tablet 0     No current facility-administered medications on file prior to visit.        Past obstetric, gynecological, medical, surgical, family and social history reviewed.  Relevant lab work and imaging reviewed.    Review of systems  Constitutional:  denies fever, chills, malaise.   ENT/Mouth:  denies sore throat, tinnitus  Eyes: denies vision changes/pain  CV:  denies chest  pain  Respiratory:  denies cough/SOB  GI:  denies N/V, diarrhea, abdominal pain.    :   denies dysuria  Skin:  denies lesions or pruritus   Neuro:  denies weakness, focal neurologic symptoms    Vitals:    24 0837   BP: 107/55   BP Location: Right arm   Patient Position: Sitting   Pulse: 114   Temp: 97.8 °F (36.6 °C)   TempSrc: Temporal       PHYSICAL EXAM   GENERAL: Not in acute distress, AAOx3, pleasant    LABS:   [id number] - low risk female      ULTRASOUND   Please view full ultrasound note on Imaging tab in ViewPoint.  - Cephalic, normal cardiac activity and amniotic fluid volume.  - Posterior placenta with a marginal to velamentous cord insertion  - Anatomy appears normal with the exception of suboptimal DA  - EFW 1181 grams (2lb 10oz) 69%    ASSESSMENT/COUNSELIN y.o.   27w0d gestation (Estimated Date of Delivery: 3/28/25).  Today's ultrasound shows a placenta that is posterior and a placental cord insertion that is at the placental edge.  The placental cord insertion is in the fundal region and well away from the cervix.  There is no extended distance between the cord insertion and placental edge.    The baby is appropriately grown; in fact, the abdominal circumference is the largest measurement.  Therefore there is no evidence of uteroplacental insufficiency or chronic cord compression.  The findings were discussed with the patient and her significant other.  Some studies have reported marginal cord insertion is to be associated with an increased risk for growth restriction.  For Mrs. Madrid, the insertion site is well away from the lower uterine segment.  I suspect there will be low probability of chronic or repeated compression.  Nevertheless, serial growth scans are indicated.  In general, these can be conducted with the primary OB.  However, I am asking her to return here in 5 weeks so that we can attempt to evaluate the ductus arteriosus which was inadequately seen during our  anatomical survey today.       -Pregnancy  [ X ] stable  [   ] improving [  ] worsening    Diagnoses and all orders for this visit:    1. Increased BMI (Primary)    2. Velamentous insertion of umbilical cord in third trimester      Summary of Plan  -Maternal-fetal medicine follow-up in 5 weeks for check ductus arteriosus, growth and BPP.  If that evaluation is normal, further maternal-fetal medicine follow-up will not be required.  -Given the patient's BMI, growth should be checked every 4 to 6 weeks throughout the third trimester  -Starting at 28 weeks: Fetal movement instructions given continue daily until delivery; instructed to report to labor and delivery if cannot achieve more than 10 kicks in 2 hours or if she perceives a decrease in fetal movement  -Weekly biophysical profiles or nonstress test plus PATRICK beginning at 36 weeks (BMI) - with primary OB    Follow-up: No follow up with MFM scheduled, but I am happy to see for follow up at request of primary obstetrician    Thank you for the consult and opportunity to care for this patient.  Please feel free to reach out with any questions or concerns.      I spent 45 minutes caring for this patient on this date of service. This time includes time spent by me in the following activities: preparing for the visit, reviewing tests, obtaining and/or reviewing a separately obtained history, performing a medically appropriate examination and/or evaluation, counseling and educating the patient/family/caregiver and independently interpreting results and communicating that information with the patient/family/caregiver with greater than 50% spent in counseling and coordination of care. This time did NOT include time for interpretation of imaging or electronic fetal monitoring.     Chalo Handy MD FACOG  Maternal Fetal Medicine-[facility]  Office: [phone]  [email or web address] (Closed)          Pt reports that she is doing well and denies vaginal bleeding,  cramping, contractions or LOF at this time. Reports active fetal movement. Reviewed when to call OB office or present to L&D for evaluation with symptoms such as decreased fetal movement, vaginal bleeding, LOF or ctxs. Pt verbalized understanding. Denies HA, visual changes or epigastric pain. Denies any additional complaints at time of appointment. Next OB appointment scheduled for 1/16.    Vitals:    12/27/24 0837   BP: 107/55   Pulse: 114   Temp: 97.8 °F (36.6 °C)

## 2025-01-16 ENCOUNTER — ROUTINE PRENATAL (OUTPATIENT)
Dept: OBSTETRICS AND GYNECOLOGY | Age: 25
End: 2025-01-16
Payer: COMMERCIAL

## 2025-01-16 VITALS — SYSTOLIC BLOOD PRESSURE: 116 MMHG | DIASTOLIC BLOOD PRESSURE: 70 MMHG | WEIGHT: 229.8 LBS | BODY MASS INDEX: 39.45 KG/M2

## 2025-01-16 DIAGNOSIS — Z13.89 SCREENING FOR BLOOD OR PROTEIN IN URINE: Primary | ICD-10-CM

## 2025-01-16 DIAGNOSIS — O43.199 MARGINAL INSERTION OF UMBILICAL CORD AFFECTING MANAGEMENT OF MOTHER: ICD-10-CM

## 2025-01-16 DIAGNOSIS — Z34.03 ENCOUNTER FOR SUPERVISION OF NORMAL FIRST PREGNANCY IN THIRD TRIMESTER: ICD-10-CM

## 2025-01-16 LAB
GLUCOSE UR STRIP-MCNC: NEGATIVE MG/DL
PROT UR STRIP-MCNC: NEGATIVE MG/DL

## 2025-01-16 NOTE — PROGRESS NOTES
Ivis Madrid, a 24 y.o.  at 29w6d, presents for OB follow-up.  She is doing well today.  Denies loss of fluid, vaginal bleeding, and contractions.  Endorses fetal movements.  Some heartburn, recommended taking the prilosec daily    Objective  BP Readings from Last 3 Encounters:   25 116/70   24 107/55   24 124/72      Wt Readings from Last 3 Encounters:   25 104 kg (229 lb 12.8 oz)   24 102 kg (225 lb 12.8 oz)   24 101 kg (223 lb)      Total weight gain this pregnancy: 6.26 kg (13 lb 12.8 oz)    General: Awake, alert, no apparent distress  Respiratory: No increased work of breathing  Abdomen: Fundus soft and nontender  Extremity: Nontender, no edema    A/P  Diagnoses and all orders for this visit:    1. Screening for blood or protein in urine (Primary)  -     POC Urinalysis Dipstick    2. Marginal insertion of umbilical cord affecting management of mother    3. Encounter for supervision of normal first pregnancy in third trimester    Other orders  -     Tdap Vaccine Greater Than or Equal To 8yo IM      Serial growth US, next with MFM  Tdap today    Return for every 2 wks til 36 wk. growth at 36, then weekly please.    Tressa Montgomery MD

## 2025-01-17 ENCOUNTER — TRANSCRIBE ORDERS (OUTPATIENT)
Dept: ULTRASOUND IMAGING | Facility: HOSPITAL | Age: 25
End: 2025-01-17
Payer: COMMERCIAL

## 2025-01-17 DIAGNOSIS — O28.3 ABNORMAL FETAL ULTRASOUND: Primary | ICD-10-CM

## 2025-01-29 ENCOUNTER — HOSPITAL ENCOUNTER (OUTPATIENT)
Dept: ULTRASOUND IMAGING | Facility: HOSPITAL | Age: 25
Discharge: HOME | End: 2025-01-29
Admitting: OBSTETRICS & GYNECOLOGY
Payer: COMMERCIAL

## 2025-01-29 ENCOUNTER — OFFICE VISIT (OUTPATIENT)
Dept: OBSTETRICS AND GYNECOLOGY | Facility: CLINIC | Age: 25
End: 2025-01-29
Payer: COMMERCIAL

## 2025-01-29 ENCOUNTER — ROUTINE PRENATAL (OUTPATIENT)
Dept: OBSTETRICS AND GYNECOLOGY | Age: 25
End: 2025-01-29
Payer: COMMERCIAL

## 2025-01-29 VITALS
HEART RATE: 98 BPM | WEIGHT: 230 LBS | DIASTOLIC BLOOD PRESSURE: 61 MMHG | TEMPERATURE: 97.1 F | SYSTOLIC BLOOD PRESSURE: 104 MMHG | BODY MASS INDEX: 39.27 KG/M2 | HEIGHT: 64 IN

## 2025-01-29 VITALS — DIASTOLIC BLOOD PRESSURE: 62 MMHG | BODY MASS INDEX: 39.97 KG/M2 | SYSTOLIC BLOOD PRESSURE: 114 MMHG | WEIGHT: 233 LBS

## 2025-01-29 DIAGNOSIS — R63.8 INCREASED BMI: ICD-10-CM

## 2025-01-29 DIAGNOSIS — O28.3 ABNORMAL FETAL ULTRASOUND: ICD-10-CM

## 2025-01-29 DIAGNOSIS — O43.199 MARGINAL INSERTION OF UMBILICAL CORD AFFECTING MANAGEMENT OF MOTHER: ICD-10-CM

## 2025-01-29 DIAGNOSIS — Z34.03 ENCOUNTER FOR SUPERVISION OF NORMAL FIRST PREGNANCY IN THIRD TRIMESTER: ICD-10-CM

## 2025-01-29 DIAGNOSIS — Z13.89 SCREENING FOR BLOOD OR PROTEIN IN URINE: Primary | ICD-10-CM

## 2025-01-29 DIAGNOSIS — O43.199 MARGINAL INSERTION OF UMBILICAL CORD AFFECTING MANAGEMENT OF MOTHER: Primary | ICD-10-CM

## 2025-01-29 PROBLEM — O43.123 VELAMENTOUS INSERTION OF UMBILICAL CORD IN THIRD TRIMESTER: Status: RESOLVED | Noted: 2024-12-27 | Resolved: 2025-01-29

## 2025-01-29 LAB
GLUCOSE UR STRIP-MCNC: NEGATIVE MG/DL
PROT UR STRIP-MCNC: NEGATIVE MG/DL

## 2025-01-29 PROCEDURE — 76816 OB US FOLLOW-UP PER FETUS: CPT

## 2025-01-29 PROCEDURE — 76819 FETAL BIOPHYS PROFIL W/O NST: CPT

## 2025-01-29 NOTE — PROGRESS NOTES
Ivis Madrid, a 24 y.o.  at 31w5d, presents for OB follow-up.  She is doing well today.  Denies loss of fluid, vaginal bleeding, and contractions.  Endorses fetal movements.    Objective  BP Readings from Last 3 Encounters:   25 114/62   25 104/61   25 116/70      Wt Readings from Last 3 Encounters:   25 106 kg (233 lb)   25 104 kg (230 lb)   25 104 kg (229 lb 12.8 oz)      Total weight gain this pregnancy: 7.711 kg (17 lb)    General: Awake, alert, no apparent distress  Respiratory: No increased work of breathing  Abdomen: Fundus soft and nontender  Extremity: Nontender, no edema    A/P  Diagnoses and all orders for this visit:    1. Screening for blood or protein in urine (Primary)  -     POC Urinalysis Dipstick    2. Increased BMI    3. Encounter for supervision of normal first pregnancy in third trimester    4. Marginal insertion of umbilical cord affecting management of mother    Normal growth today with MFM  RSV vaccine next visit  Reviewed peds, pain control in labor    Labor precautions reviewed  FU 2 wks    Tressa Montgomery MD

## 2025-01-29 NOTE — PROGRESS NOTES
MATERNAL FETAL MEDICINE CONSULT NOTE    Dear Dr Tressa Montgomery MD:    Thank you for your kind referral of Ivis Madrid.  As you know, she is a 24 y.o.   31w5d gestation (Estimated Date of Delivery: 3/28/25). This is a consult.     HER ANTEPARTUM COURSE IS COMPLICATED BY:  Marginal cord insertion   BMI 39    ANEUPLOIDY SCREENING: Low risk    HPI: Today, denies contractions, LOF, vaginal bleeding. Reports normal fetal movement.   She denies headache, vision changes, shortness of breath, acute changes in edema, and RUQ pain.     REVIEW OF HISTORY  Past Medical History:   Diagnosis Date    Anxiety     Heart burn     PCOS (polycystic ovarian syndrome)     PMS (premenstrual syndrome) 2008 first period    Polycystic ovary syndrome     Rough estimate for age     Past Surgical History:   Procedure Laterality Date    CHOLECYSTECTOMY WITH INTRAOPERATIVE CHOLANGIOGRAM N/A 2022    Procedure: CHOLECYSTECTOMY LAPAROSCOPIC INTRAOPERATIVE CHOLANGIOGRAM;  Surgeon: Toni Villatoro Jr., MD;  Location: [facility] MAIN OR;  Service: General;  Laterality: N/A;    ERCP N/A 2022    Procedure: ENDOSCOPIC RETROGRADE CHOLANGIOPANCREATOGRAPHY with sphincterotomy, cholangiogram, and balloon sweep and common bile duct;  Surgeon: Robin gM MD;  Location: [facility] ENDOSCOPY;  Service: Gastroenterology;  Laterality: N/A;  Pre op: Common Bile Duct Obstruction   Post op: CBD stones     INNER EAR SURGERY      KNEE SURGERY Right     LAPAROSCOPIC CHOLECYSTECTOMY      WISDOM TOOTH EXTRACTION  2016       OB History    Para Term  AB Living   1 0 0 0 0 0   SAB IAB Ectopic Molar Multiple Live Births   0 0 0 0 0 0      # Outcome Date GA Lbr Jose Enrique/2nd Weight Sex Type Anes PTL Lv   1 Current              Social History     Socioeconomic History    Marital status: Single   Tobacco Use    Smoking status: Never    Smokeless tobacco: Never   Vaping Use    Vaping status: Never Used   Substance and Sexual Activity     Alcohol use: Not Currently     Comment: OCC     Drug use: Not Currently     Frequency: 0.8 times per week     Types: Marijuana     Comment: Stopped once tested positive    Sexual activity: Yes     Partners: Male     Birth control/protection: None     Family History   Problem Relation Age of Onset    Diabetes Father     Arthritis Father     Arthritis Mother     Asthma Mother     Deep vein thrombosis Sister     Heart disease Paternal Grandfather     Colon cancer Neg Hx     Colon polyps Neg Hx     Crohn's disease Neg Hx     Irritable bowel syndrome Neg Hx     Ulcerative colitis Neg Hx     Breast cancer Neg Hx     Ovarian cancer Neg Hx     Uterine cancer Neg Hx     Congenital heart disease Neg Hx       Allergies   Allergen Reactions    Sulfa Antibiotics Hives    Sulfamethoxazole-Trimethoprim Hives and Unknown - Low Severity      Current Outpatient Medications on File Prior to Visit   Medication Sig Dispense Refill    omeprazole (priLOSEC) 20 MG capsule Take 1 capsule by mouth Daily.      Prenatal Vit-Fe Fumarate-FA (prenatal vitamin 27-0.8) 27-0.8 MG tablet tablet Take  by mouth Daily.       No current facility-administered medications on file prior to visit.        Common labs          8/7/2024    14:44 12/23/2024    10:53   Common Labs   WBC 10.0  10.4    Hemoglobin 13.1  11.1    Hematocrit 39.9  33.8    Platelets 297  295    Hemoglobin A1C 5.5           Past obstetric, gynecological, medical, surgical, family and social history reviewed.  Relevant lab work and imaging reviewed.    REVIEW OF SYSTEMS  Constitutional:  denies fever, chills, malaise.   ENT/Mouth:  denies sore throat, tinnitus  Eyes: denies vision changes/pain  CV:  denies chest pain  Respiratory:  denies cough/SOB  GI:  denies N/V, diarrhea, abdominal pain.    :   denies dysuria  Skin:  denies lesions or pruritus   Neuro:  denies weakness, focal neurologic symptoms    Vitals:    01/29/25 1258   BP: 104/61   BP Location: Right arm   Patient Position:  "Sitting   Pulse: 98   Temp: 97.1 °F (36.2 °C)   TempSrc: Temporal   Weight: 104 kg (230 lb)   Height: 162.6 cm (64.02\")       Estimated body mass index is 39.46 kg/m² as calculated from the following:    Height as of this encounter: 162.6 cm (64.02\").    Weight as of this encounter: 104 kg (230 lb).    PHYSICAL EXAM   General: No acute distress, pleasant, AAO x 3  Respiratory: No increased work of breathing, speaking in complete sentences without difficulty, symmetric chest rise  Cardiac: Regular rate   abdominal: Gravid, nontender  Extremities: No significant edema  Neuro/psych: Awake, Alert and oriented, appropriate mood/affect      ULTRASOUND   A full ultrasound report can be found under imaging tab    Cephalic presentation  Posterior placenta  Marginal cord insertion is noted  Fetal biometry is consistent with dates EFW 76%, AC 98%  PATRICK 17 cm which is normal  BPP 8/8  The fetal anatomic survey is now complete and appears normal      ASSESSMENT AND PLAN      Diagnoses and all orders for this visit:    1. Marginal insertion of umbilical cord affecting management of mother (Primary)    2. Increased BMI       MARGINAL CORD INSERTION  Previously Counseled.   Second-trimester marginal cord insertion can be associated with increased likelihood for low birth weight inconsistently in some studies.  Insertion of the umbilical cord within <2 cm from the placental margin is described as marginal cord insertion.  We discussed monthly growth exams, but I reassured her that the association was small.      Marginal cord insertion is a condition in which the umbilical cord is inserted at or near the placental margin rather than in the center. The cord can be inserted as close to 2 cm from the edge of the placenta . The incidence is 7% to 9% of ferreira pregnancies and 24% to 33% in twin pregnancies. Complications associated with marginal cord insertion are  labor, fetal distress, and intrauterine growth " restriction.    Recommend serial growth ultrasounds every 4 weeks at primary OB.     BMI > 35  Body mass index of greater than 30 is associated with an increased risk for a number of pregnancy complications including gestational diabetes, hypertensive disorders in pregnancy and dystocia.      Obesity is associated with an increased risk for venous thromboembolism. In the absence of past history of thrombosis or of a known thrombophilia, antepartum prophylaxis (other than baby ASA) is not indicated. Postpartum thromboprophylaxis can be considered. Postpartum compliance can be variable (patients get hot or fail to ask for device to be reapplied after bathroom breaks).       SUMMARY OF PLAN  -Serial growth ultrasounds every 4  weeks (by primary OB)   -Starting at 36 weeks: Weekly fetal  surveillance until delivery   -Starting at 28 weeks: Fetal movement instructions given continue daily until delivery; instructed to report to labor and delivery if cannot achieve more than 10 kicks in one hour or if she perceives a decrease in fetal movement  -Continue routine prenatal care with primary ob team   -At this time, delivery is recommended at 39 weeks, unless indicated sooner for maternal or fetal reasons    Follow-up: No follow up with MFM scheduled, but I am happy to see for follow up at request of primary obstetrician    Thank you for the consult and opportunity to care for this patient.  Please feel free to reach out with any questions or concerns.      I spent 30 minutes caring for this patient on this date of service. This time includes time spent by me in the following activities: preparing for the visit, reviewing tests, obtaining and/or reviewing a separately obtained history, performing a medically appropriate examination and/or evaluation, counseling and educating the patient/family/caregiver and independently interpreting results and communicating that information with the patient/family/caregiver with  greater than 50% spent in counseling and coordination of care.     Lucía Nunez, CHELI  1/29/2025  Maternal Fetal Medicine-[facility]  Office: [phone]  [email or web address]

## 2025-01-29 NOTE — PROGRESS NOTES
Pt reports that she is doing well and denies vaginal bleeding, cramping, contractions or LOF at this time. Reports active fetal movement. Reviewed when to call OB office or present to L&D for evaluation with symptoms such as decreased fetal movement, vaginal bleeding, LOF or ctxs. Pt verbalized understanding. Denies HA, visual changes or epigastric pain. Denies any additional complaints at time of appointment. Next OB appointment scheduled for 1/29.        Vitals:    01/29/25 1258   BP: 104/61   Pulse: 98   Temp: 97.1 °F (36.2 °C)

## 2025-01-29 NOTE — LETTER
2025     Tressa Montgomery MD  [address]  [address]  [place] [phone]    Patient: Ivis Madrid   YOB: 2000   Date of Visit: 2025       Dear Tressa Montgomery MD    Ivis Madrid was in my office today. Below is a copy of my note.    If you have questions, please do not hesitate to call me. I look forward to following Ivis along with you.         Sincerely,        CHELI Ovalle        CC: No Recipients    Pt reports that she is doing well and denies vaginal bleeding, cramping, contractions or LOF at this time. Reports active fetal movement. Reviewed when to call OB office or present to L&D for evaluation with symptoms such as decreased fetal movement, vaginal bleeding, LOF or ctxs. Pt verbalized understanding. Denies HA, visual changes or epigastric pain. Denies any additional complaints at time of appointment. Next OB appointment scheduled for .        Vitals:    25 1258   BP: 104/61   Pulse: 98   Temp: 97.1 °F (36.2 °C)         MATERNAL FETAL MEDICINE CONSULT NOTE    Dear Dr Tressa Montgomery MD:    Thank you for your kind referral of Ivis Madrid.  As you know, she is a 24 y.o.   31w5d gestation (Estimated Date of Delivery: 3/28/25). This is a consult.     HER ANTEPARTUM COURSE IS COMPLICATED BY:  Marginal cord insertion   BMI 39    ANEUPLOIDY SCREENING: Low risk    HPI: Today, denies contractions, LOF, vaginal bleeding. Reports normal fetal movement.   She denies headache, vision changes, shortness of breath, acute changes in edema, and RUQ pain.     REVIEW OF HISTORY  Past Medical History:   Diagnosis Date   • Anxiety    • Heart burn    • PCOS (polycystic ovarian syndrome)    • PMS (premenstrual syndrome) 2008 first period   • Polycystic ovary syndrome     Rough estimate for age     Past Surgical History:   Procedure Laterality Date   • CHOLECYSTECTOMY WITH INTRAOPERATIVE CHOLANGIOGRAM N/A 2022    Procedure: CHOLECYSTECTOMY  LAPAROSCOPIC INTRAOPERATIVE CHOLANGIOGRAM;  Surgeon: Toni Villatoro Jr., MD;  Location: [facility] MAIN OR;  Service: General;  Laterality: N/A;   • ERCP N/A 2022    Procedure: ENDOSCOPIC RETROGRADE CHOLANGIOPANCREATOGRAPHY with sphincterotomy, cholangiogram, and balloon sweep and common bile duct;  Surgeon: Robin Mg MD;  Location: [facility] ENDOSCOPY;  Service: Gastroenterology;  Laterality: N/A;  Pre op: Common Bile Duct Obstruction   Post op: CBD stones    • INNER EAR SURGERY     • KNEE SURGERY Right    • LAPAROSCOPIC CHOLECYSTECTOMY     • WISDOM TOOTH EXTRACTION         OB History    Para Term  AB Living   1 0 0 0 0 0   SAB IAB Ectopic Molar Multiple Live Births   0 0 0 0 0 0      # Outcome Date GA Lbr Jose Enrique/2nd Weight Sex Type Anes PTL Lv   1 Current              Social History     Socioeconomic History   • Marital status: Single   Tobacco Use   • Smoking status: Never   • Smokeless tobacco: Never   Vaping Use   • Vaping status: Never Used   Substance and Sexual Activity   • Alcohol use: Not Currently     Comment: OCC    • Drug use: Not Currently     Frequency: 0.8 times per week     Types: Marijuana     Comment: Stopped once tested positive   • Sexual activity: Yes     Partners: Male     Birth control/protection: None     Family History   Problem Relation Age of Onset   • Diabetes Father    • Arthritis Father    • Arthritis Mother    • Asthma Mother    • Deep vein thrombosis Sister    • Heart disease Paternal Grandfather    • Colon cancer Neg Hx    • Colon polyps Neg Hx    • Crohn's disease Neg Hx    • Irritable bowel syndrome Neg Hx    • Ulcerative colitis Neg Hx    • Breast cancer Neg Hx    • Ovarian cancer Neg Hx    • Uterine cancer Neg Hx    • Congenital heart disease Neg Hx       Allergies   Allergen Reactions   • Sulfa Antibiotics Hives   • Sulfamethoxazole-Trimethoprim Hives and Unknown - Low Severity      Current Outpatient Medications on File Prior to Visit   Medication  "Sig Dispense Refill   • omeprazole (priLOSEC) 20 MG capsule Take 1 capsule by mouth Daily.     • Prenatal Vit-Fe Fumarate-FA (prenatal vitamin 27-0.8) 27-0.8 MG tablet tablet Take  by mouth Daily.       No current facility-administered medications on file prior to visit.        Common labs          8/7/2024    14:44 12/23/2024    10:53   Common Labs   WBC 10.0  10.4    Hemoglobin 13.1  11.1    Hematocrit 39.9  33.8    Platelets 297  295    Hemoglobin A1C 5.5           Past obstetric, gynecological, medical, surgical, family and social history reviewed.  Relevant lab work and imaging reviewed.    REVIEW OF SYSTEMS  Constitutional:  denies fever, chills, malaise.   ENT/Mouth:  denies sore throat, tinnitus  Eyes: denies vision changes/pain  CV:  denies chest pain  Respiratory:  denies cough/SOB  GI:  denies N/V, diarrhea, abdominal pain.    :   denies dysuria  Skin:  denies lesions or pruritus   Neuro:  denies weakness, focal neurologic symptoms    Vitals:    01/29/25 1258   BP: 104/61   BP Location: Right arm   Patient Position: Sitting   Pulse: 98   Temp: 97.1 °F (36.2 °C)   TempSrc: Temporal   Weight: 104 kg (230 lb)   Height: 162.6 cm (64.02\")       Estimated body mass index is 39.46 kg/m² as calculated from the following:    Height as of this encounter: 162.6 cm (64.02\").    Weight as of this encounter: 104 kg (230 lb).    PHYSICAL EXAM   General: No acute distress, pleasant, AAO x 3  Respiratory: No increased work of breathing, speaking in complete sentences without difficulty, symmetric chest rise  Cardiac: Regular rate   abdominal: Gravid, nontender  Extremities: No significant edema  Neuro/psych: Awake, Alert and oriented, appropriate mood/affect      ULTRASOUND   A full ultrasound report can be found under imaging tab          ASSESSMENT AND PLAN      Diagnoses and all orders for this visit:    1. Marginal insertion of umbilical cord affecting management of mother (Primary)    2. Increased BMI     "   MARGINAL CORD INSERTION  Previously Counseled.   Second-trimester marginal cord insertion can be associated with increased likelihood for low birth weight inconsistently in some studies.  Insertion of the umbilical cord within <2 cm from the placental margin is described as marginal cord insertion.  We discussed monthly growth exams, but I reassured her that the association was small.      Marginal cord insertion is a condition in which the umbilical cord is inserted at or near the placental margin rather than in the center. The cord can be inserted as close to 2 cm from the edge of the placenta . The incidence is 7% to 9% of ferreira pregnancies and 24% to 33% in twin pregnancies. Complications associated with marginal cord insertion are  labor, fetal distress, and intrauterine growth restriction.    Recommend serial growth ultrasounds every 4 weeks at primary OB.     BMI > 35  Body mass index of greater than 30 is associated with an increased risk for a number of pregnancy complications including gestational diabetes, hypertensive disorders in pregnancy and dystocia.      Obesity is associated with an increased risk for venous thromboembolism. In the absence of past history of thrombosis or of a known thrombophilia, antepartum prophylaxis (other than baby ASA) is not indicated. Postpartum thromboprophylaxis can be considered. Postpartum compliance can be variable (patients get hot or fail to ask for device to be reapplied after bathroom breaks).       SUMMARY OF PLAN  -Serial growth ultrasounds every 4  weeks (by primary OB)   -Starting at 36 weeks: Weekly fetal  surveillance until delivery   -Starting at 28 weeks: Fetal movement instructions given continue daily until delivery; instructed to report to labor and delivery if cannot achieve more than 10 kicks in one hour or if she perceives a decrease in fetal movement  -Continue routine prenatal care with primary ob team   -At this time, delivery  is recommended at 39 weeks, unless indicated sooner for maternal or fetal reasons    Follow-up: No follow up with MFM scheduled, but I am happy to see for follow up at request of primary obstetrician    Thank you for the consult and opportunity to care for this patient.  Please feel free to reach out with any questions or concerns.      I spent 30 minutes caring for this patient on this date of service. This time includes time spent by me in the following activities: preparing for the visit, reviewing tests, obtaining and/or reviewing a separately obtained history, performing a medically appropriate examination and/or evaluation, counseling and educating the patient/family/caregiver and independently interpreting results and communicating that information with the patient/family/caregiver with greater than 50% spent in counseling and coordination of care.     Lucía Nunez, CHELI  1/29/2025  Maternal Fetal Medicine-[facility]  Office: [phone]  [email or web address]

## 2025-02-12 ENCOUNTER — ROUTINE PRENATAL (OUTPATIENT)
Dept: OBSTETRICS AND GYNECOLOGY | Age: 25
End: 2025-02-12
Payer: COMMERCIAL

## 2025-02-12 VITALS — DIASTOLIC BLOOD PRESSURE: 74 MMHG | BODY MASS INDEX: 40.15 KG/M2 | SYSTOLIC BLOOD PRESSURE: 130 MMHG | WEIGHT: 234 LBS

## 2025-02-12 DIAGNOSIS — O43.199 MARGINAL INSERTION OF UMBILICAL CORD AFFECTING MANAGEMENT OF MOTHER: ICD-10-CM

## 2025-02-12 DIAGNOSIS — Z34.03 ENCOUNTER FOR SUPERVISION OF NORMAL FIRST PREGNANCY IN THIRD TRIMESTER: Primary | ICD-10-CM

## 2025-02-12 LAB
CLARITY, POC: CLEAR
COLOR UR: YELLOW
GLUCOSE UR STRIP-MCNC: NEGATIVE MG/DL
PROT UR STRIP-MCNC: NEGATIVE MG/DL

## 2025-02-13 ENCOUNTER — TELEPHONE (OUTPATIENT)
Dept: OBSTETRICS AND GYNECOLOGY | Age: 25
End: 2025-02-13
Payer: COMMERCIAL

## 2025-02-13 NOTE — PROGRESS NOTES
Ivis Madrid, a 24 y.o.  at 33w5d, presents for OB follow-up.  She is doing well today.  Denies loss of fluid, vaginal bleeding, and contractions currently. She did have recurrent contractions two days ago and considered coming to labor and delivery.  Endorses fetal movements.    Objective  BP Readings from Last 3 Encounters:   25 130/74   25 114/62   25 104/61      Wt Readings from Last 3 Encounters:   25 106 kg (234 lb)   25 106 kg (233 lb)   25 104 kg (230 lb)      Total weight gain this pregnancy: 8.165 kg (18 lb)    General: Awake, alert, no apparent distress  Respiratory: No increased work of breathing  Abdomen: Fundus soft and nontender  Extremity: Nontender, no edema    A/P  Diagnoses and all orders for this visit:    1. Encounter for supervision of normal first pregnancy in third trimester (Primary)  -     POC Urinalysis Dipstick    2. Marginal insertion of umbilical cord affecting management of mother    We were out of stock w abrysvo today, but was able to receive it from the pharmacy downstairs  GBS next visit    Labor precautions reviewed    Return for weekly with BPP after next visit.    Tressa Montgomery MD

## 2025-02-22 ENCOUNTER — HOSPITAL ENCOUNTER (EMERGENCY)
Facility: HOSPITAL | Age: 25
Discharge: HOME | End: 2025-02-22
Attending: OBSTETRICS & GYNECOLOGY | Admitting: OBSTETRICS & GYNECOLOGY
Payer: COMMERCIAL

## 2025-02-22 VITALS
OXYGEN SATURATION: 100 % | SYSTOLIC BLOOD PRESSURE: 122 MMHG | TEMPERATURE: 98 F | DIASTOLIC BLOOD PRESSURE: 84 MMHG | HEART RATE: 108 BPM | RESPIRATION RATE: 16 BRPM

## 2025-02-22 PROBLEM — O36.8190 DECREASED FETAL MOVEMENT: Status: ACTIVE | Noted: 2025-02-22

## 2025-02-22 PROCEDURE — 59025 FETAL NON-STRESS TEST: CPT

## 2025-02-22 PROCEDURE — 99284 EMERGENCY DEPT VISIT MOD MDM: CPT | Performed by: OBSTETRICS & GYNECOLOGY

## 2025-02-22 NOTE — OBED NOTES
[facility]  Ivis Madrid  : 2000  MRN: [id number]  CSN: [id number]    OB ED Provider Note    Subjective   Chief Complaint   Patient presents with    Decreased Fetal Movement     DFM for 1 day, no vaginal bleeding or LOF, occasional contractions     Ivis Madrid is a 25 y.o. year old  with an Estimated Date of Delivery: 3/28/25 currently at 35w1d presenting with decreased fetal movement. She denies abdominal pain but reports occasional tightening. She denies leaking or bleeding.    Prenatal care has been with Dr. Montgomery.  It has been complicated by marginal cord insertion, obesity, anxiety.    OB History    Para Term  AB Living   1 0 0 0 0 0   SAB IAB Ectopic Molar Multiple Live Births   0 0 0 0 0 0      # Outcome Date GA Lbr Jose Enrique/2nd Weight Sex Type Anes PTL Lv   1 Current              Past Medical History:   Diagnosis Date    Anxiety     Heart burn     PCOS (polycystic ovarian syndrome)     PMS (premenstrual syndrome) 2008 first period    Polycystic ovary syndrome     Rough estimate for age     Past Surgical History:   Procedure Laterality Date    CHOLECYSTECTOMY WITH INTRAOPERATIVE CHOLANGIOGRAM N/A 2022    Procedure: CHOLECYSTECTOMY LAPAROSCOPIC INTRAOPERATIVE CHOLANGIOGRAM;  Surgeon: Toni Villatoro Jr., MD;  Location: [facility] MAIN OR;  Service: General;  Laterality: N/A;    ERCP N/A 2022    Procedure: ENDOSCOPIC RETROGRADE CHOLANGIOPANCREATOGRAPHY with sphincterotomy, cholangiogram, and balloon sweep and common bile duct;  Surgeon: Robin Mg MD;  Location: [facility] ENDOSCOPY;  Service: Gastroenterology;  Laterality: N/A;  Pre op: Common Bile Duct Obstruction   Post op: CBD stones     INNER EAR SURGERY      KNEE SURGERY Right     LAPAROSCOPIC CHOLECYSTECTOMY      WISDOM TOOTH EXTRACTION       No current facility-administered medications for this encounter.    Allergies   Allergen Reactions    Sulfa Antibiotics Hives     Sulfamethoxazole-Trimethoprim Hives and Unknown - Low Severity     Social History    Tobacco Use      Smoking status: Never      Smokeless tobacco: Never    Review of Systems  +decreased FM      Objective   /84   Pulse 108   Temp 98 °F (36.7 °C) (Oral)   Resp 16   LMP 06/16/2024 (Exact Date)   General: well developed; well nourished  no acute distress  mentation appropriate   Abdomen: soft, non-tender; no masses  gravid    FHT's: 150, moderate, +accels, no decels      Cervix: was not checked.       Contractions: regular but not perceiving   Chest: Unlabored respirations    CV:  normal rate, regular rhythm,  no murmurs, rubs, or gallops   Ext:   No C/C/E   Back: CVA tenderness is absent bilateral        Prenatal Labs  Lab Results   Component Value Date    HGB 11.1 12/23/2024    RUBELLAABIGG 1.17 08/07/2024    HEPBSAG Negative 08/07/2024    ABSCRN Negative 08/07/2024    OHP2HJB7 Non Reactive 08/07/2024     12/23/2024    URINECX Final report 08/07/2024    CHLAMNAA Negative 08/07/2024    NGONORRHON Negative 08/07/2024          Assessment and Plan  IUP at 35w1d with decreased fetal movement  NST reactive, patient feeling more movement since arrival      2.   Discussed kick counts    Judit Casey MD  2/22/2025  14:45 EST

## 2025-02-26 ENCOUNTER — ROUTINE PRENATAL (OUTPATIENT)
Dept: OBSTETRICS AND GYNECOLOGY | Age: 25
End: 2025-02-26
Payer: COMMERCIAL

## 2025-02-26 VITALS — WEIGHT: 242 LBS | DIASTOLIC BLOOD PRESSURE: 78 MMHG | SYSTOLIC BLOOD PRESSURE: 124 MMHG | BODY MASS INDEX: 41.52 KG/M2

## 2025-02-26 DIAGNOSIS — Z36.85 ANTENATAL SCREENING FOR STREPTOCOCCUS B: ICD-10-CM

## 2025-02-26 DIAGNOSIS — Z34.03 ENCOUNTER FOR SUPERVISION OF NORMAL FIRST PREGNANCY IN THIRD TRIMESTER: ICD-10-CM

## 2025-02-26 DIAGNOSIS — Z13.89 SCREENING FOR BLOOD OR PROTEIN IN URINE: ICD-10-CM

## 2025-02-26 DIAGNOSIS — O43.199 MARGINAL INSERTION OF UMBILICAL CORD AFFECTING MANAGEMENT OF MOTHER: Primary | ICD-10-CM

## 2025-02-26 PROBLEM — O36.8190 DECREASED FETAL MOVEMENT: Status: RESOLVED | Noted: 2025-02-22 | Resolved: 2025-02-26

## 2025-02-26 LAB
GLUCOSE UR STRIP-MCNC: NEGATIVE MG/DL
PROT UR STRIP-MCNC: NEGATIVE MG/DL

## 2025-02-26 NOTE — PROGRESS NOTES
Chief Complaint   Patient presents with    Routine Prenatal Visit     35w5d OB Check with U/S and GBS       Ivis ALEC Madrid, a 25 y.o.  at 35w5d, presents for OB follow-up.  She is doing well today.  Denies loss of fluid, vaginal bleeding, and contractions.  Endorses fetal movements.    Objective  BP Readings from Last 3 Encounters:   25 124/78   25 122/84   25 130/74      Wt Readings from Last 3 Encounters:   25 110 kg (242 lb)   25 106 kg (234 lb)   25 106 kg (233 lb)      Total weight gain this pregnancy: 11.8 kg (26 lb)    Last OB US Data (since 8/10/2024)         Value Time User    EFW%ILE  75%ile 2025  3:11 PM Tressa Montgomery MD    AC%ILE  88%ile 2025  3:11 PM Tressa Montgomery MD    Placenta location  Posterior 2025  3:11 PM Tressa Montgomery MD            General: Awake, alert, no apparent distress  Respiratory: No increased work of breathing  Abdomen: Fundus soft and nontender  Extremity: Nontender, no edema    Results from last 7 days   Lab Units 25  1457   GLUCOSE UA mg/dL Negative   PROTEIN UA mg/dL Negative         A/P     Diagnoses and all orders for this visit:    1. Marginal insertion of umbilical cord affecting management of mother (Primary)    2. Screening for blood or protein in urine  -     POC Urinalysis Dipstick    3.  screening for streptococcus B  -     Group B Streptococcus Culture - Swab, Vaginal/Rectum    4. Encounter for supervision of normal first pregnancy in third trimester      FU weekly with BPP  Normal, slightly accelerated fetal growth    Labor precautions reviewed  No follow-ups on file.    Tressa Montgomery MD

## 2025-03-02 LAB — B-HEM STREP SPEC QL CULT: POSITIVE

## 2025-03-05 ENCOUNTER — ROUTINE PRENATAL (OUTPATIENT)
Dept: OBSTETRICS AND GYNECOLOGY | Age: 25
End: 2025-03-05
Payer: COMMERCIAL

## 2025-03-05 ENCOUNTER — HOSPITAL ENCOUNTER (INPATIENT)
Facility: HOSPITAL | Age: 25
LOS: 1 days | Discharge: HOME | End: 2025-03-06
Attending: OBSTETRICS & GYNECOLOGY | Admitting: OBSTETRICS & GYNECOLOGY
Payer: COMMERCIAL

## 2025-03-05 VITALS — BODY MASS INDEX: 41.69 KG/M2 | SYSTOLIC BLOOD PRESSURE: 128 MMHG | WEIGHT: 243 LBS | DIASTOLIC BLOOD PRESSURE: 80 MMHG

## 2025-03-05 VITALS
RESPIRATION RATE: 15 BRPM | TEMPERATURE: 98 F | WEIGHT: 243 LBS | HEART RATE: 90 BPM | OXYGEN SATURATION: 96 % | SYSTOLIC BLOOD PRESSURE: 109 MMHG | DIASTOLIC BLOOD PRESSURE: 61 MMHG | BODY MASS INDEX: 41.48 KG/M2 | HEIGHT: 64 IN

## 2025-03-05 DIAGNOSIS — Z34.03 ENCOUNTER FOR SUPERVISION OF NORMAL FIRST PREGNANCY IN THIRD TRIMESTER: ICD-10-CM

## 2025-03-05 DIAGNOSIS — O36.8130 DECREASED FETAL MOVEMENTS IN THIRD TRIMESTER, SINGLE OR UNSPECIFIED FETUS: ICD-10-CM

## 2025-03-05 DIAGNOSIS — O43.199 MARGINAL INSERTION OF UMBILICAL CORD AFFECTING MANAGEMENT OF MOTHER: ICD-10-CM

## 2025-03-05 DIAGNOSIS — Z13.89 SCREENING FOR BLOOD OR PROTEIN IN URINE: Primary | ICD-10-CM

## 2025-03-05 PROBLEM — O99.019 MATERNAL ANEMIA IN PREGNANCY, ANTEPARTUM: Status: ACTIVE | Noted: 2025-03-05

## 2025-03-05 PROBLEM — Z34.90 PREGNANCY: Status: ACTIVE | Noted: 2025-03-05

## 2025-03-05 PROBLEM — O28.9 ABNORMAL ANTENATAL TEST: Status: ACTIVE | Noted: 2025-03-05

## 2025-03-05 PROBLEM — O99.820 GBS (GROUP B STREPTOCOCCUS CARRIER), +RV CULTURE, CURRENTLY PREGNANT: Status: ACTIVE | Noted: 2025-03-05

## 2025-03-05 LAB
ABO GROUP BLD: NORMAL
BLD GP AB SCN SERPL QL: NEGATIVE
DEPRECATED RDW RBC AUTO: 38.2 FL (ref 37–54)
ERYTHROCYTE [DISTWIDTH] IN BLOOD BY AUTOMATED COUNT: 12.7 % (ref 12.3–15.4)
GLUCOSE UR STRIP-MCNC: NEGATIVE MG/DL
HCT VFR BLD AUTO: 29.4 % (ref 34–46.6)
HGB BLD-MCNC: 9.8 G/DL (ref 12–15.9)
MCH RBC QN AUTO: 27.5 PG (ref 26.6–33)
MCHC RBC AUTO-ENTMCNC: 33.3 G/DL (ref 31.5–35.7)
MCV RBC AUTO: 82.6 FL (ref 79–97)
PLATELET # BLD AUTO: 277 10*3/MM3 (ref 140–450)
PMV BLD AUTO: 12.6 FL (ref 6–12)
PROT UR STRIP-MCNC: NEGATIVE MG/DL
RBC # BLD AUTO: 3.56 10*6/MM3 (ref 3.77–5.28)
RH BLD: POSITIVE
T&S EXPIRATION DATE: NORMAL
WBC NRBC COR # BLD AUTO: 9.91 10*3/MM3 (ref 3.4–10.8)

## 2025-03-05 PROCEDURE — 86900 BLOOD TYPING SEROLOGIC ABO: CPT | Performed by: OBSTETRICS & GYNECOLOGY

## 2025-03-05 PROCEDURE — 86850 RBC ANTIBODY SCREEN: CPT | Performed by: OBSTETRICS & GYNECOLOGY

## 2025-03-05 PROCEDURE — 59025 FETAL NON-STRESS TEST: CPT

## 2025-03-05 PROCEDURE — 86901 BLOOD TYPING SEROLOGIC RH(D): CPT | Performed by: OBSTETRICS & GYNECOLOGY

## 2025-03-05 PROCEDURE — 85027 COMPLETE CBC AUTOMATED: CPT | Performed by: OBSTETRICS & GYNECOLOGY

## 2025-03-05 RX ORDER — SODIUM CHLORIDE 9 MG/ML
40 INJECTION, SOLUTION INTRAVENOUS AS NEEDED
Status: DISCONTINUED | OUTPATIENT
Start: 2025-03-05 | End: 2025-03-06 | Stop reason: HOSPADM

## 2025-03-05 RX ORDER — SODIUM CHLORIDE 0.9 % (FLUSH) 0.9 %
10 SYRINGE (ML) INJECTION EVERY 12 HOURS SCHEDULED
Status: DISCONTINUED | OUTPATIENT
Start: 2025-03-05 | End: 2025-03-06 | Stop reason: HOSPADM

## 2025-03-05 RX ORDER — BISACODYL 10 MG
10 SUPPOSITORY, RECTAL RECTAL DAILY PRN
Status: DISCONTINUED | OUTPATIENT
Start: 2025-03-05 | End: 2025-03-06 | Stop reason: HOSPADM

## 2025-03-05 RX ORDER — DOCUSATE SODIUM 100 MG/1
100 CAPSULE, LIQUID FILLED ORAL 2 TIMES DAILY PRN
Status: DISCONTINUED | OUTPATIENT
Start: 2025-03-05 | End: 2025-03-06 | Stop reason: HOSPADM

## 2025-03-05 RX ORDER — SODIUM CHLORIDE 0.9 % (FLUSH) 0.9 %
10 SYRINGE (ML) INJECTION AS NEEDED
Status: DISCONTINUED | OUTPATIENT
Start: 2025-03-05 | End: 2025-03-06 | Stop reason: HOSPADM

## 2025-03-05 RX ORDER — LIDOCAINE HYDROCHLORIDE 10 MG/ML
0.5 INJECTION, SOLUTION INFILTRATION; PERINEURAL ONCE AS NEEDED
Status: DISCONTINUED | OUTPATIENT
Start: 2025-03-05 | End: 2025-03-06 | Stop reason: HOSPADM

## 2025-03-05 RX ORDER — CALCIUM CARBONATE 500 MG/1
2 TABLET, CHEWABLE ORAL DAILY PRN
Status: DISCONTINUED | OUTPATIENT
Start: 2025-03-05 | End: 2025-03-06 | Stop reason: HOSPADM

## 2025-03-05 RX ORDER — SODIUM CHLORIDE, SODIUM LACTATE, POTASSIUM CHLORIDE, CALCIUM CHLORIDE 600; 310; 30; 20 MG/100ML; MG/100ML; MG/100ML; MG/100ML
125 INJECTION, SOLUTION INTRAVENOUS CONTINUOUS
Status: DISCONTINUED | OUTPATIENT
Start: 2025-03-05 | End: 2025-03-06 | Stop reason: HOSPADM

## 2025-03-05 RX ORDER — CYCLOBENZAPRINE HCL 10 MG
10 TABLET ORAL ONCE
Status: COMPLETED | OUTPATIENT
Start: 2025-03-05 | End: 2025-03-05

## 2025-03-05 RX ORDER — ONDANSETRON 2 MG/ML
4 INJECTION INTRAMUSCULAR; INTRAVENOUS EVERY 8 HOURS PRN
Status: DISCONTINUED | OUTPATIENT
Start: 2025-03-05 | End: 2025-03-06 | Stop reason: HOSPADM

## 2025-03-05 RX ORDER — ACETAMINOPHEN 325 MG/1
650 TABLET ORAL EVERY 4 HOURS PRN
Status: DISCONTINUED | OUTPATIENT
Start: 2025-03-05 | End: 2025-03-06 | Stop reason: HOSPADM

## 2025-03-05 RX ORDER — ONDANSETRON 4 MG/1
8 TABLET, ORALLY DISINTEGRATING ORAL EVERY 8 HOURS PRN
Status: DISCONTINUED | OUTPATIENT
Start: 2025-03-05 | End: 2025-03-06 | Stop reason: HOSPADM

## 2025-03-05 RX ADMIN — CYCLOBENZAPRINE HYDROCHLORIDE 10 MG: 10 TABLET, FILM COATED ORAL at 21:39

## 2025-03-05 NOTE — PROGRESS NOTES
Chief Complaint   Patient presents with    Routine Prenatal Visit     36w5d OB Check with U/S       Ivis MICHAEL Julius, a 25 y.o.  at 36w5d, presents for OB follow-up.  She is tired, was unable to sleep last night due to sciatica and some ctx. She stayed home from work today and napped, so hasn't been up much today to notice fetal movement but since she has been here has felt a little bit. Is perhaps sluggish.  She notes increase in LE swelling mary jane after working    Objective  BP Readings from Last 3 Encounters:   25 128/80   25 124/78   25 122/84      Wt Readings from Last 3 Encounters:   25 110 kg (243 lb)   25 110 kg (242 lb)   25 106 kg (234 lb)      Total weight gain this pregnancy: 12.2 kg (27 lb)    Last OB US Data (since 2024)         Value Time User    EFW%ILE  75%ile 2025  3:11 PM Tressa Montgomery MD    AC%ILE  88%ile 2025  3:11 PM Tressa Montgomery MD    Placenta location  Posterior 2025  3:11 PM Tressa Montgomery MD            General: Awake, alert, no apparent distress  Respiratory: No increased work of breathing  Abdomen: Fundus soft and nontender  Extremity: Nontender, trace pitting edema BL LE    Results from last 7 days   Lab Units 25  1517   GLUCOSE UA mg/dL Negative   PROTEIN UA mg/dL Negative         A/P     Diagnoses and all orders for this visit:    1. Screening for blood or protein in urine (Primary)  -     POC Urinalysis Dipstick    2. Decreased fetal movements in third trimester, single or unspecified fetus    3. Encounter for supervision of normal first pregnancy in third trimester    4. Marginal insertion of umbilical cord affecting management of mother      BPP  (movement and tone). To L&D for monitoring and repeat BPP. D/w Dr. Newton on call.   Also may be time that she stop working, discussed today. No absolute indication to stop at this time.  Has increased edema, will monitor BP At hospital also      Tressa Montgomery  MD

## 2025-03-06 ENCOUNTER — APPOINTMENT (OUTPATIENT)
Dept: ULTRASOUND IMAGING | Facility: HOSPITAL | Age: 25
End: 2025-03-06
Payer: COMMERCIAL

## 2025-03-06 PROCEDURE — 76816 OB US FOLLOW-UP PER FETUS: CPT

## 2025-03-06 PROCEDURE — 76819 FETAL BIOPHYS PROFIL W/O NST: CPT

## 2025-03-06 NOTE — NURSING NOTE
Lab came to draw T-pal. Dr Newton was at bedside with pt and told them it was not necessary. Said if they decide to keep patient, then will draw it.

## 2025-03-06 NOTE — PLAN OF CARE
Problem: Adult Inpatient Plan of Care  Goal: Plan of Care Review  Outcome: Progressing  Goal: Patient-Specific Goal (Individualized)  Outcome: Progressing  Goal: Absence of Hospital-Acquired Illness or Injury  Outcome: Progressing  Intervention: Identify and Manage Fall Risk  Intervention: Prevent and Manage VTE (Venous Thromboembolism) Risk  Goal: Optimal Comfort and Wellbeing  Outcome: Progressing  Intervention: Monitor Pain and Promote Comfort  Intervention: Provide Person-Centered Care  Goal: Readiness for Transition of Care  Outcome: Progressing     Problem: Labor  Goal: Stable Fetal Wellbeing  Outcome: Progressing  Intervention: Promote and Monitor Fetal Wellbeing  Goal: Acceptable Pain Control  Outcome: Progressing   Goal Outcome Evaluation:  Plan of Care Reviewed With: patient, spouse        Progress: no change  Outcome Evaluation: RNST. VSS. Denies ctx/vb/lof/epigastric pain. +FM. BPP today. T-pall has not been done per Dr Newton. Draw if pt is to stay.

## 2025-03-06 NOTE — NON STRESS TEST
Ivisalex Madrid, a  at 36w6d with an SARBJIT of 3/28/2025, Date entered prior to episode creation, was seen at [address] for a nonstress test.    Chief Complaint   Patient presents with    Decreased Fetal Movement       Patient Active Problem List   Diagnosis    Calculus of gallbladder without cholecystitis without obstruction    Increased BMI    Supervision of normal pregnancy    Marginal insertion of umbilical cord affecting management of mother    Pregnancy    Biophysical profile     Maternal anemia in pregnancy, antepartum    GBS (group B Streptococcus carrier), +RV culture, currently pregnant       Start Time: 704  Stop Time: 746    Interpretation A  Nonstress Test Interpretation A: Reactive

## 2025-03-06 NOTE — NURSING NOTE
Paged Dr Newton.  Ok to come off continuous monitoring as strip is reassuring.  Also, ok to stop LR continuous as pt is eating and drinking fine.  Provider to be on unit shortly.

## 2025-03-06 NOTE — PLAN OF CARE
Goal Outcome Evaluation:  Plan of Care Reviewed With: patient        Progress: improving  Outcome Evaluation: rnst, bpp 8/8 today, okay to dc home and return for 1 hour nst on saturday, pt aware and vu.

## 2025-03-06 NOTE — PROGRESS NOTES
[facility]   Obstetrics and Gynecology   Antepartum Note      3/6/2025    Name:Ivis Madrid    MR#:[id number]    Progress note                        HD:1    Subjective     Chief Complaint   Patient presents with    Decreased Fetal Movement       25 y.o. yo Female  at 36w6d admitted for biophysical profile  performed for marginal cord insertion and obesity.    Subsequent serial  testing have been reassuring with reactive category 1 fetal heart tracings.  Repeat BPP today was .  PATRICK 1.  Fetal growth reportedly normal     Patient reports regular fetal movement.     Patient Active Problem List   Diagnosis    Calculus of gallbladder without cholecystitis without obstruction    Increased BMI    Supervision of normal pregnancy    Marginal insertion of umbilical cord affecting management of mother    Pregnancy    Biophysical profile     Maternal anemia in pregnancy, antepartum    GBS (group B Streptococcus carrier), +RV culture, currently pregnant        Review of system  Review of Systems   Constitutional: Negative.    Respiratory: Negative.     Cardiovascular: Negative.    Gastrointestinal: Negative.    Genitourinary: Negative.    Psychiatric/Behavioral: Negative.         Objective    Vitals  Temp:  Temp:  [98 °F (36.7 °C)] 98 °F (36.7 °C)     BP:  BP: (105-128)/(61-80) 109/61  Pulse:  Heart Rate:  [90-95] 90  RR:   Resp:  [15-18] 15    Wt Readings from Last 3 Encounters:   25 110 kg (243 lb)   25 110 kg (243 lb)   25 110 kg (242 lb)       Body mass index is 41.71 kg/m².    Exam:    Physical Exam  Constitutional:       General: She is not in acute distress.  Pulmonary:      Effort: Pulmonary effort is normal.   Abdominal:      Palpations: Abdomen is soft.      Tenderness: There is no abdominal tenderness.   Skin:     General: Skin is warm.   Neurological:      Mental Status: She is alert.   Psychiatric:         Mood and Affect: Mood normal.         Thought  Content: Thought content normal.         Judgment: Judgment normal.         I/O last 3 completed shifts:  In: 180.2 [I.V.:180.2]  Out: -     Results from last 7 days   Lab Units 03/05/25  1713   WBC 10*3/mm3 9.91   HEMOGLOBIN g/dL 9.8*   HEMATOCRIT % 29.4*   PLATELETS 10*3/mm3 277           Fetal Tracing:  Reactive, Category I         Assessment/Plan  Intrauterine pregnancy at 36w6d    Pregnancy    Increased BMI    Marginal insertion of umbilical cord affecting management of mother    Biophysical profile 4/8    Maternal anemia in pregnancy, antepartum    GBS (group B Streptococcus carrier), +RV culture, currently pregnant    Single episode of nonreassuring biophysical profile with subsequent testing reassuring including serial nonstress test and biophysical profile.  PATRICK is normal and interval fetal growth this morning reportedly normal.    Fetal status is reassuring.      Plan:  Discharge with fetal kick counts  Return Saturday for 1 hour nonstress test  Repeat BPP on Monday  Return promptly for decreased fetal movement      Arie Hartmann MD  3/6/2025 08:27 EST

## 2025-03-06 NOTE — NON STRESS TEST
Ivisalex Madrid, a  at 36w5d with an SARBJIT of 3/28/2025, Date entered prior to episode creation, was seen at [address] for a nonstress test.    Chief Complaint   Patient presents with    Decreased Fetal Movement       Patient Active Problem List   Diagnosis    Calculus of gallbladder without cholecystitis without obstruction    Increased BMI    Supervision of normal pregnancy    Marginal insertion of umbilical cord affecting management of mother    Pregnancy    Biophysical profile     Maternal anemia in pregnancy, antepartum    GBS (group B Streptococcus carrier), +RV culture, currently pregnant       Start Time:   Stop Time:     Interpretation A  Nonstress Test Interpretation A: Reactive

## 2025-03-06 NOTE — H&P
[facility]  Obstetric History and Physical    Chief Complaint   Patient presents with    Decreased Fetal Movement       Subjective     Patient is a 25 y.o. female  currently at 36w5d, who presents for prolonged monitoring after her biophysical profile today in the office was 4 out of 8.  Points were detected for tone and movement.  Patient denies decreased fetal movement.  She reports fetus is moving as usual.  Reports some mild contractions overnight last night.  Denies any vaginal bleeding or loss of fluid.    Her prenatal care is complicated by increased to BMI and marginal insertion of umbilical cord..  Her previous obstetric/gynecological history is noted for is non-contributory.    The following portions of the patients history were reviewed and updated as appropriate: current medications, allergies, past medical history, past surgical history, past family history, past social history, and problem list .       Prenatal Information:  Prenatal Results       Initial Prenatal Labs       Test Value Reference Range Date Time    Hemoglobin  13.1 g/dL 11.1 - 15.9 24 1444    Hematocrit  39.9 % 34.0 - 46.6 24 1444    Platelets  297 x10E3/uL 150 - 450 24 1444    Rubella IgG  1.17 index Immune >0.99 24 1444    Hepatitis B SAg  Negative  Negative 24 1444    Hepatitis C Ab        RPR  Non Reactive  Non Reactive 24 1444    T. Pallidum Ab   Non Reactive  Non Reactive 24 1053    ABO  A   25 1713    Rh  Positive   25 1713    Antibody Screen  Negative  Negative 24 1444    HIV  Non Reactive  Non Reactive 24 1444    Urine Culture  Final report   24     Gonorrhea  Negative  Negative 24     Chlamydia  Negative  Negative 24     TSH        HgB A1c   5.5 % 4.8 - 5.6 24 1444    Varicella IgG  673 index Immune >165 24 1444    Hemoglobinopathy Fractionation  Comment   24 1444    Hemoglobinopathy (genetic testing)        Cystic  fibrosis         Spinal muscular atrophy        Fragile X                  Fetal testing        Test Value Reference Range Date Time    NIPT        MSAFP  *Screen Negative*   10/30/24 1518    AFP-4                  2nd and 3rd Trimester       Test Value Reference Range Date Time    Hemoglobin (repeated)  9.8 g/dL 12.0 - 15.9 03/05/25 1713       11.1 g/dL 11.1 - 15.9 12/23/24 1053    Hematocrit (repeated)  29.4 % 34.0 - 46.6 03/05/25 1713       33.8 % 34.0 - 46.6 12/23/24 1053    Platelets   277 10*3/mm3 140 - 450 03/05/25 1713       295 x10E3/uL 150 - 450 12/23/24 1053       297 x10E3/uL 150 - 450 08/07/24 1444    1 hour GTT   117 mg/dL 70 - 139 12/23/24 1053    Antibody Screen (repeated)  Negative   03/05/25 1713    3rd TM syphilis scrn (repeated)  RPR         3rd TM syphilis scrn (repeated) TP-Ab  Non Reactive  Non Reactive 12/23/24 1053    3rd TM syphilis screen TB-Ab (FTA)  Non Reactive  Non Reactive 12/23/24 1053    Syphilis cascade test TP-Ab (EIA)        Syphilis cascade TPPA        GTT Fasting        GTT 1 Hr        GTT 2 Hr        GTT 3 Hr        Group B Strep  Positive  Negative 02/26/25               Other testing        Test Value Reference Range Date Time    Parvo IgG         CMV IgG                   Drug Screening       Test Value Reference Range Date Time    Amphetamine Screen  Negative ng/mL Ncjqxs=0737 08/07/24     Barbiturate Screen  Negative ng/mL Voulwo=051 08/07/24     Benzodiazepine Screen  Negative ng/mL Qxpplj=683 08/07/24     Methadone Screen  Negative ng/mL Nnjafb=240 08/07/24     Phencyclidine Screen  Negative ng/mL Cutoff=25 08/07/24     Opiates Screen  Negative ng/mL Jpwmnf=245 08/07/24     THC Screen  Positive  Cutoff=50 08/07/24     Cocaine Screen  Negative ng/mL Hfpoye=121 08/07/24     Propoxyphene Screen  Negative ng/mL Jkgmqc=305 08/07/24     Buprenorphine Screen        Methamphetamine Screen        Oxycodone Screen        Tricyclic Antidepressants Screen                  Legend     ^: Historical                          External Prenatal Results       Pregnancy Outside Results - Transcribed From Office Records - See Scanned Records For Details       Test Value Date Time    ABO  A  03/05/25 1713    Rh  Positive  03/05/25 1713    Antibody Screen  Negative  03/05/25 1713       Negative  08/07/24 1444    Varicella IgG  673 index 08/07/24 1444    Rubella  1.17 index 08/07/24 1444    Hgb  9.8 g/dL 03/05/25 1713       11.1 g/dL 12/23/24 1053       13.1 g/dL 08/07/24 1444    Hct  29.4 % 03/05/25 1713       33.8 % 12/23/24 1053       39.9 % 08/07/24 1444    HgB A1c   5.5 % 08/07/24 1444    1h GTT  117 mg/dL 12/23/24 1053    3h GTT Fasting       3h GTT 1 hour       3h GTT 2 hour       3h GTT 3 hour        Gonorrhea (discrete)  Negative  08/07/24     Chlamydia (discrete)  Negative  08/07/24     RPR  Non Reactive  08/07/24 1444    Syphils cascade: TP-Ab (FTA)  Non Reactive  12/23/24 1053    TP-Ab  Non Reactive  12/23/24 1053    TP-Ab (EIA)       TPPA       HBsAg  Negative  08/07/24 1444    Herpes Simplex Virus PCR       Herpes Simplex VIrus Culture       HIV  Non Reactive  08/07/24 1444    Hep C RNA Quant PCR       Hep C Antibody       AFP  36.6 ng/mL 10/30/24 1518    NIPT       Cystic Fibrosis ([place])       Cystic Fibroisis        Spinal Muscular atrophy       Fragile X       Group B Strep  Positive  02/26/25     GBS Susceptibility to Clindamycin       GBS Susceptibility to Erythromycin       Fetal Fibronectin       Genetic Testing, Maternal Blood                 Drug Screening       Test Value Date Time    Urine Drug Screen       Amphetamine Screen  Negative ng/mL 08/07/24     Barbiturate Screen  Negative ng/mL 08/07/24     Benzodiazepine Screen  Negative ng/mL 08/07/24     Methadone Screen  Negative ng/mL 08/07/24     Phencyclidine Screen  Negative ng/mL 08/07/24     Opiates Screen       THC Screen       Cocaine Screen       Propoxyphene Screen  Negative ng/mL 08/07/24     Buprenorphine Screen        Methamphetamine Screen       Oxycodone Screen       Tricyclic Antidepressants Screen                 Legend    ^: Historical                             Past OB History:     OB History    Para Term  AB Living   1 0 0 0 0 0   SAB IAB Ectopic Molar Multiple Live Births   0 0 0 0 0 0      # Outcome Date GA Lbr Jose Enrique/2nd Weight Sex Type Anes PTL Lv   1 Current                Past Medical History: Past Medical History:   Diagnosis Date    Anxiety     Heart burn     PCOS (polycystic ovarian syndrome)     PMS (premenstrual syndrome) 2008 first period    Polycystic ovary syndrome     Rough estimate for age    Pregnancy 3/5/2025      Past Surgical History Past Surgical History:   Procedure Laterality Date    CHOLECYSTECTOMY WITH INTRAOPERATIVE CHOLANGIOGRAM N/A 2022    Procedure: CHOLECYSTECTOMY LAPAROSCOPIC INTRAOPERATIVE CHOLANGIOGRAM;  Surgeon: Toni Villatoro Jr., MD;  Location: [facility] MAIN OR;  Service: General;  Laterality: N/A;    ERCP N/A 2022    Procedure: ENDOSCOPIC RETROGRADE CHOLANGIOPANCREATOGRAPHY with sphincterotomy, cholangiogram, and balloon sweep and common bile duct;  Surgeon: Robin Mg MD;  Location: [facility] ENDOSCOPY;  Service: Gastroenterology;  Laterality: N/A;  Pre op: Common Bile Duct Obstruction   Post op: CBD stones     INNER EAR SURGERY      KNEE SURGERY Right     LAPAROSCOPIC CHOLECYSTECTOMY      WISDOM TOOTH EXTRACTION        Family History: Family History   Problem Relation Age of Onset    Diabetes Father     Arthritis Father     Arthritis Mother     Asthma Mother     Deep vein thrombosis Sister     Heart disease Paternal Grandfather     Colon cancer Neg Hx     Colon polyps Neg Hx     Crohn's disease Neg Hx     Irritable bowel syndrome Neg Hx     Ulcerative colitis Neg Hx     Breast cancer Neg Hx     Ovarian cancer Neg Hx     Uterine cancer Neg Hx     Congenital heart disease Neg Hx       Social History:  reports that she has never smoked.  She has never used smokeless tobacco.   reports that she does not currently use alcohol.   reports that she does not currently use drugs after having used the following drugs: Marijuana. Frequency: 0.80 times per week.        Review of Systems   Constitutional:  Negative for chills, fatigue and fever.   Gastrointestinal:  Negative for abdominal distention and abdominal pain.   Genitourinary:  Negative for dysuria, vaginal bleeding, vaginal discharge and vaginal pain.   All other systems reviewed and are negative.        Objective     Vital Signs Range for the last 24 hours  Temperature: Temp:  [98 °F (36.7 °C)] 98 °F (36.7 °C)   Temp Source:     BP: BP: (105-128)/(61-80) 109/61   Pulse: Heart Rate:  [90-95] 90   Respirations: Resp:  [15-18] 15   SPO2: SpO2:  [96 %] 96 %   O2 Amount (l/min):     O2 Devices Device (Oxygen Therapy): room air   Weight: Weight:  [110 kg (243 lb)] 110 kg (243 lb)     Physical Examination: General appearance - alert, well appearing, and in no distress  Abdomen - gravid, soft, nontender, nondistended, no masses or organomegaly  Musculoskeletal - no joint tenderness, deformity or swelling  Extremities - peripheral pulses normal, no pedal edema, no clubbing or cyanosis  Skin - normal coloration and turgor, no rashes, no suspicious skin lesions noted    Presentation: Vertex    Cervix: Exam by:     Dilation:     Effacement:     Station:       Fetal Heart Rate Assessment   Method: Fetal HR Assessment Method: external   Beats/min: Fetal HR (beats/min): 140   Baseline: Fetal HR Baseline: normal range   Variability: Fetal HR Variability: moderate (amplitude range 6 to 25 bpm)   Accels: Fetal HR Accelerations: lasting at least 15 seconds, greater than/equal to 15 bpm   Decels: Fetal HR Decelerations: absent   Tracing Category: Fetal HR Tracing Category: Category I     Uterine Assessment   Method: Method: external tocotransducer   Frequency (min): Contraction Frequency (Minutes): none   Ctx Count in  "10 min:     Duration:     Intensity:     Intensity by IUPC:     Resting Tone: Uterine Resting Tone: soft by palpation   Resting Tone by IUPC:     Mary Units:       Laboratory Results:   Results from last 7 days   Lab Units 25  1713   WBC 10*3/mm3 9.91   HEMOGLOBIN g/dL 9.8*   HEMATOCRIT % 29.4*   PLATELETS 10*3/mm3 277         Invalid input(s): \"LABALBU\", \"PROT\"      Assessment & Plan       Pregnancy    Increased BMI    Marginal insertion of umbilical cord affecting management of mother    Biophysical profile     Maternal anemia in pregnancy, antepartum    GBS (group B Streptococcus carrier), +RV culture, currently pregnant      Assessment & Plan    Assessment:  1.  Intrauterine pregnancy at 36w5d gestation with reactive, reassuring fetal status.    2.  Abnormal  testing with biophysical profile of 4 out of 8  3.  Obstetrical history significant for is non-contributory.  4.  GBS status:   Strep Gp B Culture   Date Value Ref Range Status   2025 Positive (A) Negative Final     Comment:     Centers for Disease Control and Prevention (CDC) and American Congress  of Obstetricians and Gynecologists (ACOG) guidelines for prevention of   group B streptococcal (GBS) disease specify co-collection of  a vaginal and rectal swab specimen to maximize sensitivity of GBS  detection. Per the CDC and ACOG, swabbing both the lower vagina and  rectum substantially increases the yield of detection compared with  sampling the vagina alone.  Penicillin G, ampicillin, or cefazolin are indicated for intrapartum  prophylaxis of  GBS colonization. Reflex susceptibility  testing should be performed prior to use of clindamycin only on GBS  isolates from penicillin-allergic women who are considered a high risk  for anaphylaxis. Treatment with vancomycin without additional testing  is warranted if resistance to clindamycin is noted.         Plan:  1.  Extended fetal monitoring tonight has been " reassuring.  Repeat BPP in a.m.  2. Plan of care has been reviewed with patient and .  3.  Risks, benefits of treatment plan have been discussed.  4.  All questions have been answered.  5.  Consider IV iron in a.m. after BPP      Kyra Newton MD  3/5/2025  21:50 EST

## 2025-03-07 NOTE — DISCHARGE SUMMARY
[facility]   Obstetrics and Gynecology   Antepartum Discharge Summary      Date of Admission: 3/5/2025    Date of Discharge:  3/6/2025      Patient: Ivis Madrid      MR#:[id number]      Discharge Diagnosis:   Intrauterine pregnancy at 36w6d    Pregnancy    Increased BMI    Marginal insertion of umbilical cord affecting management of mother    Biophysical profile     Maternal anemia in pregnancy, antepartum    GBS (group B Streptococcus carrier), +RV culture, currently pregnant      Hospital Course  Patient is a 25 y.o. female  at 36w6d admitted for abnormal BPP  with serial reassuring  testing.  Admission NST was category 1 as was serial studies.  Repeat BPP was  with reactive NST 10/10.    Patient reported regular fetal movement after admission.     Condition on Discharge:  Stable    Vital Signs       Results from last 7 days   Lab Units 25  1713   WBC 10*3/mm3 9.91   HEMOGLOBIN g/dL 9.8*   HEMATOCRIT % 29.4*   PLATELETS 10*3/mm3 277             Discharge Disposition  Home or Self Care    Discharge Medications     Discharge Medications        Continue These Medications        Instructions Start Date   omeprazole 20 MG capsule  Commonly known as: priLOSEC   20 mg, Daily      prenatal vitamin 27-0.8 27-0.8 MG tablet tablet   Daily             Stop These Medications      Abrysvo 120 MCG/0.5ML reconstituted solution injection  Generic drug: RSV Pre-Fusion F A&B Vac Rcmb              Discharge Diet: Regular    Activity at Discharge: Ad joelle with fetal kick counts    Follow-up Appointments  Future Appointments   Date Time Provider Department Center   3/8/2025  9:00 AM [facility] LD PROCEDURE ROOM [facility]   3/12/2025  2:30 PM  [facility] 2 [facility]  [facility]   3/12/2025  3:00 PM Tressa Montgomery MD [facility]  [facility]   3/19/2025  3:15 PM Tressa Montgomery MD [facility]  [facility]     3/8 1 hour NST on LD  3/10 Repeat BPP       Prenatal labs/vax:   Immunization History   Administered  Date(s) Administered    ABRYSVO (RSV, 60+ or pregnant women 32-36 wks) 02/12/2025    COVID-19 (PFIZER) 12YRS+ (COMIRNATY) 11/17/2024    COVID-19 (PFIZER) Purple Cap Monovalent 04/30/2021, 05/21/2021, 12/06/2021    Fluzone (or Fluarix & Flulaval for VFC) >6mos 01/04/2022    Tdap 01/16/2025         External Prenatal Results       Pregnancy Outside Results - Transcribed From Office Records - See Scanned Records For Details       Test Value Date Time    ABO  A  03/05/25 1713    Rh  Positive  03/05/25 1713    Antibody Screen  Negative  03/05/25 1713       Negative  08/07/24 1444    Varicella IgG  673 index 08/07/24 1444    Rubella  1.17 index 08/07/24 1444    Hgb  9.8 g/dL 03/05/25 1713       11.1 g/dL 12/23/24 1053       13.1 g/dL 08/07/24 1444    Hct  29.4 % 03/05/25 1713       33.8 % 12/23/24 1053       39.9 % 08/07/24 1444    HgB A1c   5.5 % 08/07/24 1444    1h GTT  117 mg/dL 12/23/24 1053    3h GTT Fasting       3h GTT 1 hour       3h GTT 2 hour       3h GTT 3 hour        Gonorrhea (discrete)  Negative  08/07/24     Chlamydia (discrete)  Negative  08/07/24     RPR  Non Reactive  08/07/24 1444    Syphils cascade: TP-Ab (FTA)  Non Reactive  12/23/24 1053    TP-Ab  Non Reactive  12/23/24 1053    TP-Ab (EIA)       TPPA       HBsAg  Negative  08/07/24 1444    Herpes Simplex Virus PCR       Herpes Simplex VIrus Culture       HIV  Non Reactive  08/07/24 1444    Hep C RNA Quant PCR       Hep C Antibody       AFP  36.6 ng/mL 10/30/24 1518    NIPT       Cystic Fibrosis ([place])       Cystic Fibroisis        Spinal Muscular atrophy       Fragile X       Group B Strep  Positive  02/26/25     GBS Susceptibility to Clindamycin       GBS Susceptibility to Erythromycin       Fetal Fibronectin       Genetic Testing, Maternal Blood                 Drug Screening       Test Value Date Time    Urine Drug Screen       Amphetamine Screen  Negative ng/mL 08/07/24     Barbiturate Screen  Negative ng/mL 08/07/24     Benzodiazepine Screen   Negative ng/mL 08/07/24     Methadone Screen  Negative ng/mL 08/07/24     Phencyclidine Screen  Negative ng/mL 08/07/24     Opiates Screen       THC Screen       Cocaine Screen       Propoxyphene Screen  Negative ng/mL 08/07/24     Buprenorphine Screen       Methamphetamine Screen       Oxycodone Screen       Tricyclic Antidepressants Screen                 Legend    ^: Historical                              Arie Hartmann MD  03/06/25  22:18 EST

## 2025-03-08 ENCOUNTER — HOSPITAL ENCOUNTER (OUTPATIENT)
Facility: HOSPITAL | Age: 25
Setting detail: OBSERVATION
Discharge: HOME | End: 2025-03-08
Attending: OBSTETRICS & GYNECOLOGY | Admitting: OBSTETRICS & GYNECOLOGY
Payer: COMMERCIAL

## 2025-03-08 VITALS
SYSTOLIC BLOOD PRESSURE: 125 MMHG | WEIGHT: 243 LBS | HEIGHT: 64 IN | RESPIRATION RATE: 16 BRPM | TEMPERATURE: 98.1 F | BODY MASS INDEX: 41.48 KG/M2 | OXYGEN SATURATION: 97 % | HEART RATE: 97 BPM | DIASTOLIC BLOOD PRESSURE: 71 MMHG

## 2025-03-08 PROCEDURE — G0378 HOSPITAL OBSERVATION PER HR: HCPCS

## 2025-03-08 PROCEDURE — 59025 FETAL NON-STRESS TEST: CPT

## 2025-03-08 NOTE — NON STRESS TEST
Ivis Madrid, a  at 37w1d with an SARBJIT of 3/28/2025, Date entered prior to episode creation, was seen at [facility] LABOR DELIVERY for a nonstress test.    Chief Complaint   Patient presents with    Non-stress Test     Pt is here for a scheduled nst . +fm noted       Patient Active Problem List   Diagnosis    Calculus of gallbladder without cholecystitis without obstruction    Increased BMI    Supervision of normal pregnancy    Marginal insertion of umbilical cord affecting management of mother    Pregnancy    Biophysical profile     Maternal anemia in pregnancy, antepartum    GBS (group B Streptococcus carrier), +RV culture, currently pregnant       Start Time: 850  Stop Time: 913    Interpretation A  Nonstress Test Interpretation A: Reactive

## 2025-03-10 ENCOUNTER — ROUTINE PRENATAL (OUTPATIENT)
Dept: OBSTETRICS AND GYNECOLOGY | Age: 25
End: 2025-03-10
Payer: COMMERCIAL

## 2025-03-10 VITALS — SYSTOLIC BLOOD PRESSURE: 122 MMHG | WEIGHT: 245 LBS | DIASTOLIC BLOOD PRESSURE: 70 MMHG | BODY MASS INDEX: 42.05 KG/M2

## 2025-03-10 DIAGNOSIS — Z13.89 SCREENING FOR BLOOD OR PROTEIN IN URINE: Primary | ICD-10-CM

## 2025-03-10 DIAGNOSIS — O43.199 MARGINAL INSERTION OF UMBILICAL CORD AFFECTING MANAGEMENT OF MOTHER: ICD-10-CM

## 2025-03-10 DIAGNOSIS — O99.820 GBS (GROUP B STREPTOCOCCUS CARRIER), +RV CULTURE, CURRENTLY PREGNANT: ICD-10-CM

## 2025-03-10 DIAGNOSIS — Z34.03 ENCOUNTER FOR SUPERVISION OF NORMAL FIRST PREGNANCY IN THIRD TRIMESTER: ICD-10-CM

## 2025-03-10 LAB
GLUCOSE UR STRIP-MCNC: NEGATIVE MG/DL
PROT UR STRIP-MCNC: NEGATIVE MG/DL

## 2025-03-10 NOTE — PROGRESS NOTES
Chief Complaint   Patient presents with    Routine Prenatal Visit     37w3d OB Check with BPP        Ivis Madrid, a 25 y.o.  at 37w3d, presents for OB follow-up.  She is doing well today.  Denies loss of fluid, vaginal bleeding, and contractions.  Endorses fetal movements.    Objective  BP Readings from Last 3 Encounters:   03/10/25 122/70   25 125/71   25 109/61      Wt Readings from Last 3 Encounters:   03/10/25 111 kg (245 lb)   25 110 kg (243 lb)   25 110 kg (243 lb)      Total weight gain this pregnancy: 13.2 kg (29 lb)    Last OB US Data (since 2024)         Value Time User    EFW%ILE  75%ile 2025  3:11 PM Tressa Montgomery MD    AC%ILE  88%ile 2025  3:11 PM Tressa Montgomery MD    Placenta location  Posterior 2025  3:11 PM Tressa Montgomery MD            General: Awake, alert, no apparent distress  Respiratory: No increased work of breathing  Abdomen: Fundus soft and nontender  Extremity: Nontender, no edema    Results from last 7 days   Lab Units 03/10/25  1558   GLUCOSE UA mg/dL Negative   PROTEIN UA mg/dL Negative         A/P     Diagnoses and all orders for this visit:    1. Screening for blood or protein in urine (Primary)  -     POC Urinalysis Dipstick    2. Encounter for supervision of normal first pregnancy in third trimester    3. GBS (group B Streptococcus carrier), +RV culture, currently pregnant    4. Marginal insertion of umbilical cord affecting management of mother      Abnormal BPP last week but normal testing on L&D. She notes normal movement. BP was normal as well and swelling better w compression. Can follow up one week    Labor precautions reviewed  Return for cancel wed, follow up monday with BPP.    Tressa Montgomery MD

## 2025-03-17 ENCOUNTER — ROUTINE PRENATAL (OUTPATIENT)
Dept: OBSTETRICS AND GYNECOLOGY | Age: 25
End: 2025-03-17
Payer: COMMERCIAL

## 2025-03-17 VITALS — DIASTOLIC BLOOD PRESSURE: 76 MMHG | SYSTOLIC BLOOD PRESSURE: 120 MMHG | WEIGHT: 246.4 LBS | BODY MASS INDEX: 42.29 KG/M2

## 2025-03-17 DIAGNOSIS — Z34.03 ENCOUNTER FOR SUPERVISION OF NORMAL FIRST PREGNANCY IN THIRD TRIMESTER: ICD-10-CM

## 2025-03-17 DIAGNOSIS — O43.199 MARGINAL INSERTION OF UMBILICAL CORD AFFECTING MANAGEMENT OF MOTHER: ICD-10-CM

## 2025-03-17 DIAGNOSIS — Z13.89 SCREENING FOR BLOOD OR PROTEIN IN URINE: Primary | ICD-10-CM

## 2025-03-17 LAB
GLUCOSE UR STRIP-MCNC: NEGATIVE MG/DL
PROT UR STRIP-MCNC: NEGATIVE MG/DL

## 2025-03-17 PROCEDURE — 0502F SUBSEQUENT PRENATAL CARE: CPT | Performed by: OBSTETRICS & GYNECOLOGY

## 2025-03-17 NOTE — PROGRESS NOTES
Chief Complaint   Patient presents with    Routine Prenatal Visit     38w3d OB Check with U/S       Ivis Madrid, a 25 y.o.  at 38w3d, presents for OB follow-up.  She is doing well today.  Denies loss of fluid, vaginal bleeding, and contractions.  Endorses fetal movements. No increase in edema    Objective  BP Readings from Last 3 Encounters:   25 120/76   03/10/25 122/70   25 125/71      Wt Readings from Last 3 Encounters:   25 112 kg (246 lb 6.4 oz)   03/10/25 111 kg (245 lb)   25 110 kg (243 lb)      Total weight gain this pregnancy: 13.8 kg (30 lb 6.4 oz)    Last OB US Data (since 2024)         Value Time User    EFW%ILE  75%ile 2025  3:11 PM Tressa Montgomery MD    AC%ILE  88%ile 2025  3:11 PM Tressa Montgomery MD    Placenta location  Posterior 2025  3:11 PM Tressa Montgomery MD            General: Awake, alert, no apparent distress  Respiratory: No increased work of breathing  Abdomen: Fundus soft and nontender  Extremity: Nontender, no edema    Results from last 7 days   Lab Units 25  1128   GLUCOSE UA mg/dL Negative   PROTEIN UA mg/dL Negative         A/P     Diagnoses and all orders for this visit:    1. Screening for blood or protein in urine (Primary)  -     POC Urinalysis Dipstick    2. Marginal insertion of umbilical cord affecting management of mother  -     Labor Induction; Future    3. Encounter for supervision of normal first pregnancy in third trimester  -     Labor Induction; Future      BPP  today  Cervix is favorable  Will schedule induction at 39 wks      Tressa Montgomery MD

## 2025-03-21 ENCOUNTER — ANESTHESIA (OUTPATIENT)
Dept: LABOR AND DELIVERY | Facility: HOSPITAL | Age: 25
End: 2025-03-21
Payer: COMMERCIAL

## 2025-03-21 ENCOUNTER — HOSPITAL ENCOUNTER (OUTPATIENT)
Dept: LABOR AND DELIVERY | Facility: HOSPITAL | Age: 25
Discharge: HOME | End: 2025-03-21
Payer: COMMERCIAL

## 2025-03-21 ENCOUNTER — HOSPITAL ENCOUNTER (INPATIENT)
Facility: HOSPITAL | Age: 25
LOS: 3 days | Discharge: HOME | End: 2025-03-24
Attending: OBSTETRICS & GYNECOLOGY | Admitting: OBSTETRICS & GYNECOLOGY
Payer: COMMERCIAL

## 2025-03-21 ENCOUNTER — ANESTHESIA EVENT (OUTPATIENT)
Dept: LABOR AND DELIVERY | Facility: HOSPITAL | Age: 25
End: 2025-03-21
Payer: COMMERCIAL

## 2025-03-21 LAB
ALBUMIN SERPL-MCNC: 3.3 G/DL (ref 3.5–5.2)
ALBUMIN/GLOB SERPL: 1.1 G/DL
ALP SERPL-CCNC: 153 U/L (ref 39–117)
ALT SERPL W P-5'-P-CCNC: 8 U/L (ref 1–33)
ANION GAP SERPL CALCULATED.3IONS-SCNC: 10.2 MMOL/L (ref 5–15)
AST SERPL-CCNC: 14 U/L (ref 1–32)
BASOPHILS # BLD AUTO: 0.03 10*3/MM3 (ref 0–0.2)
BASOPHILS NFR BLD AUTO: 0.3 % (ref 0–1.5)
BILIRUB SERPL-MCNC: <0.2 MG/DL (ref 0–1.2)
BUN SERPL-MCNC: 10 MG/DL (ref 6–20)
BUN/CREAT SERPL: 15.4 (ref 7–25)
CALCIUM SPEC-SCNC: 9 MG/DL (ref 8.6–10.5)
CHLORIDE SERPL-SCNC: 107 MMOL/L (ref 98–107)
CO2 SERPL-SCNC: 18.8 MMOL/L (ref 22–29)
CREAT SERPL-MCNC: 0.65 MG/DL (ref 0.57–1)
DEPRECATED RDW RBC AUTO: 40.2 FL (ref 37–54)
EGFRCR SERPLBLD CKD-EPI 2021: 123.9 ML/MIN/1.73
EOSINOPHIL # BLD AUTO: 0.15 10*3/MM3 (ref 0–0.4)
EOSINOPHIL NFR BLD AUTO: 1.5 % (ref 0.3–6.2)
ERYTHROCYTE [DISTWIDTH] IN BLOOD BY AUTOMATED COUNT: 13.2 % (ref 12.3–15.4)
GLOBULIN UR ELPH-MCNC: 2.9 GM/DL
GLUCOSE SERPL-MCNC: 79 MG/DL (ref 65–99)
HCT VFR BLD AUTO: 30.4 % (ref 34–46.6)
HGB BLD-MCNC: 9.7 G/DL (ref 12–15.9)
IMM GRANULOCYTES # BLD AUTO: 0.05 10*3/MM3 (ref 0–0.05)
IMM GRANULOCYTES NFR BLD AUTO: 0.5 % (ref 0–0.5)
LYMPHOCYTES # BLD AUTO: 2.16 10*3/MM3 (ref 0.7–3.1)
LYMPHOCYTES NFR BLD AUTO: 22.3 % (ref 19.6–45.3)
MCH RBC QN AUTO: 26.4 PG (ref 26.6–33)
MCHC RBC AUTO-ENTMCNC: 31.9 G/DL (ref 31.5–35.7)
MCV RBC AUTO: 82.8 FL (ref 79–97)
MONOCYTES # BLD AUTO: 0.97 10*3/MM3 (ref 0.1–0.9)
MONOCYTES NFR BLD AUTO: 10 % (ref 5–12)
NEUTROPHILS NFR BLD AUTO: 6.32 10*3/MM3 (ref 1.7–7)
NEUTROPHILS NFR BLD AUTO: 65.4 % (ref 42.7–76)
NRBC BLD AUTO-RTO: 0 /100 WBC (ref 0–0.2)
PLATELET # BLD AUTO: 309 10*3/MM3 (ref 140–450)
PMV BLD AUTO: 12.7 FL (ref 6–12)
POTASSIUM SERPL-SCNC: 3.9 MMOL/L (ref 3.5–5.2)
PROT SERPL-MCNC: 6.2 G/DL (ref 6–8.5)
RBC # BLD AUTO: 3.67 10*6/MM3 (ref 3.77–5.28)
SODIUM SERPL-SCNC: 136 MMOL/L (ref 136–145)
TREPONEMA PALLIDUM IGG+IGM AB [PRESENCE] IN SERUM OR PLASMA BY IMMUNOASSAY: NORMAL
WBC NRBC COR # BLD AUTO: 9.68 10*3/MM3 (ref 3.4–10.8)

## 2025-03-21 PROCEDURE — 80053 COMPREHEN METABOLIC PANEL: CPT | Performed by: OBSTETRICS & GYNECOLOGY

## 2025-03-21 PROCEDURE — 86850 RBC ANTIBODY SCREEN: CPT | Performed by: OBSTETRICS & GYNECOLOGY

## 2025-03-21 PROCEDURE — 85025 COMPLETE CBC W/AUTO DIFF WBC: CPT | Performed by: OBSTETRICS & GYNECOLOGY

## 2025-03-21 PROCEDURE — 86900 BLOOD TYPING SEROLOGIC ABO: CPT | Performed by: OBSTETRICS & GYNECOLOGY

## 2025-03-21 PROCEDURE — 3E033VJ INTRODUCTION OF OTHER HORMONE INTO PERIPHERAL VEIN, PERCUTANEOUS APPROACH: ICD-10-PCS | Performed by: OBSTETRICS & GYNECOLOGY

## 2025-03-21 PROCEDURE — 25810000003 LACTATED RINGERS PER 1000 ML: Performed by: OBSTETRICS & GYNECOLOGY

## 2025-03-21 PROCEDURE — 86901 BLOOD TYPING SEROLOGIC RH(D): CPT | Performed by: OBSTETRICS & GYNECOLOGY

## 2025-03-21 PROCEDURE — 25010000002 PENICILLIN G POTASSIUM PER 600000 UNITS: Performed by: OBSTETRICS & GYNECOLOGY

## 2025-03-21 PROCEDURE — 86780 TREPONEMA PALLIDUM: CPT | Performed by: OBSTETRICS & GYNECOLOGY

## 2025-03-21 RX ORDER — ONDANSETRON 2 MG/ML
4 INJECTION INTRAMUSCULAR; INTRAVENOUS ONCE AS NEEDED
Status: DISCONTINUED | OUTPATIENT
Start: 2025-03-21 | End: 2025-03-22

## 2025-03-21 RX ORDER — ACETAMINOPHEN 325 MG/1
650 TABLET ORAL EVERY 4 HOURS PRN
Status: DISCONTINUED | OUTPATIENT
Start: 2025-03-21 | End: 2025-03-22

## 2025-03-21 RX ORDER — FAMOTIDINE 10 MG/ML
20 INJECTION, SOLUTION INTRAVENOUS 2 TIMES DAILY PRN
Status: DISCONTINUED | OUTPATIENT
Start: 2025-03-21 | End: 2025-03-22

## 2025-03-21 RX ORDER — OXYTOCIN/0.9 % SODIUM CHLORIDE 30/500 ML
2-20 PLASTIC BAG, INJECTION (ML) INTRAVENOUS
Status: DISCONTINUED | OUTPATIENT
Start: 2025-03-21 | End: 2025-03-22

## 2025-03-21 RX ORDER — BUTORPHANOL TARTRATE 2 MG/ML
2 INJECTION, SOLUTION INTRAMUSCULAR; INTRAVENOUS
Status: DISCONTINUED | OUTPATIENT
Start: 2025-03-21 | End: 2025-03-22

## 2025-03-21 RX ORDER — TERBUTALINE SULFATE 1 MG/ML
0.25 INJECTION SUBCUTANEOUS AS NEEDED
Status: DISCONTINUED | OUTPATIENT
Start: 2025-03-21 | End: 2025-03-22

## 2025-03-21 RX ORDER — SODIUM CHLORIDE 9 MG/ML
40 INJECTION, SOLUTION INTRAVENOUS AS NEEDED
Status: DISCONTINUED | OUTPATIENT
Start: 2025-03-21 | End: 2025-03-22

## 2025-03-21 RX ORDER — SODIUM CHLORIDE, SODIUM LACTATE, POTASSIUM CHLORIDE, CALCIUM CHLORIDE 600; 310; 30; 20 MG/100ML; MG/100ML; MG/100ML; MG/100ML
125 INJECTION, SOLUTION INTRAVENOUS CONTINUOUS
Status: DISCONTINUED | OUTPATIENT
Start: 2025-03-21 | End: 2025-03-22

## 2025-03-21 RX ORDER — SODIUM CHLORIDE 0.9 % (FLUSH) 0.9 %
10 SYRINGE (ML) INJECTION EVERY 12 HOURS SCHEDULED
Status: DISCONTINUED | OUTPATIENT
Start: 2025-03-21 | End: 2025-03-22

## 2025-03-21 RX ORDER — DIPHENHYDRAMINE HYDROCHLORIDE 50 MG/ML
12.5 INJECTION, SOLUTION INTRAMUSCULAR; INTRAVENOUS EVERY 8 HOURS PRN
Status: DISCONTINUED | OUTPATIENT
Start: 2025-03-21 | End: 2025-03-22

## 2025-03-21 RX ORDER — ONDANSETRON 4 MG/1
4 TABLET, ORALLY DISINTEGRATING ORAL EVERY 6 HOURS PRN
Status: DISCONTINUED | OUTPATIENT
Start: 2025-03-21 | End: 2025-03-22

## 2025-03-21 RX ORDER — MAGNESIUM CARB/ALUMINUM HYDROX 105-160MG
30 TABLET,CHEWABLE ORAL ONCE AS NEEDED
Status: DISCONTINUED | OUTPATIENT
Start: 2025-03-21 | End: 2025-03-22

## 2025-03-21 RX ORDER — ONDANSETRON 2 MG/ML
4 INJECTION INTRAMUSCULAR; INTRAVENOUS EVERY 6 HOURS PRN
Status: DISCONTINUED | OUTPATIENT
Start: 2025-03-21 | End: 2025-03-22

## 2025-03-21 RX ORDER — LIDOCAINE HYDROCHLORIDE 10 MG/ML
0.5 INJECTION, SOLUTION INFILTRATION; PERINEURAL ONCE AS NEEDED
Status: DISCONTINUED | OUTPATIENT
Start: 2025-03-21 | End: 2025-03-22

## 2025-03-21 RX ORDER — SODIUM CHLORIDE 0.9 % (FLUSH) 0.9 %
10 SYRINGE (ML) INJECTION AS NEEDED
Status: DISCONTINUED | OUTPATIENT
Start: 2025-03-21 | End: 2025-03-22

## 2025-03-21 RX ORDER — FENTANYL/ROPIVACAINE/NS/PF 2MCG/ML-.2
10 PLASTIC BAG, INJECTION (ML) INJECTION CONTINUOUS
Refills: 0 | Status: DISCONTINUED | OUTPATIENT
Start: 2025-03-21 | End: 2025-03-22

## 2025-03-21 RX ORDER — PENICILLIN G 3000000 [IU]/50ML
3 INJECTION, SOLUTION INTRAVENOUS EVERY 4 HOURS
Status: DISCONTINUED | OUTPATIENT
Start: 2025-03-22 | End: 2025-03-22

## 2025-03-21 RX ORDER — FAMOTIDINE 20 MG/1
20 TABLET, FILM COATED ORAL 2 TIMES DAILY PRN
Status: DISCONTINUED | OUTPATIENT
Start: 2025-03-21 | End: 2025-03-22

## 2025-03-21 RX ORDER — EPHEDRINE SULFATE 50 MG/ML
5 INJECTION, SOLUTION INTRAVENOUS
Status: DISCONTINUED | OUTPATIENT
Start: 2025-03-21 | End: 2025-03-22

## 2025-03-21 RX ADMIN — Medication 2 MILLI-UNITS/MIN: at 22:26

## 2025-03-21 RX ADMIN — SODIUM CHLORIDE 5 MILLION UNITS: 900 INJECTION INTRAVENOUS at 22:26

## 2025-03-21 RX ADMIN — SODIUM CHLORIDE, SODIUM LACTATE, POTASSIUM CHLORIDE, CALCIUM CHLORIDE 125 ML/HR: 20; 30; 600; 310 INJECTION, SOLUTION INTRAVENOUS at 22:26

## 2025-03-22 PROBLEM — O28.9 ABNORMAL ANTENATAL TEST: Status: RESOLVED | Noted: 2025-03-05 | Resolved: 2025-03-22

## 2025-03-22 LAB
ABO GROUP BLD: NORMAL
BLD GP AB SCN SERPL QL: NEGATIVE
RH BLD: POSITIVE
T&S EXPIRATION DATE: NORMAL

## 2025-03-22 PROCEDURE — 0KQM0ZZ REPAIR PERINEUM MUSCLE, OPEN APPROACH: ICD-10-PCS | Performed by: OBSTETRICS & GYNECOLOGY

## 2025-03-22 PROCEDURE — 25010000002 PENICILLIN G POTASSIUM PER 600000 UNITS: Performed by: OBSTETRICS & GYNECOLOGY

## 2025-03-22 PROCEDURE — 25010000002 FENTANYL CITRATE (PF) 50 MCG/ML SOLUTION: Performed by: ANESTHESIOLOGY

## 2025-03-22 PROCEDURE — 25810000003 LACTATED RINGERS PER 1000 ML: Performed by: OBSTETRICS & GYNECOLOGY

## 2025-03-22 PROCEDURE — 25010000002 LIDOCAINE-EPINEPHRINE (PF) 1 %-1:200000 SOLUTION: Performed by: ANESTHESIOLOGY

## 2025-03-22 PROCEDURE — C1755 CATHETER, INTRASPINAL: HCPCS | Performed by: ANESTHESIOLOGY

## 2025-03-22 PROCEDURE — 59400 OBSTETRICAL CARE: CPT | Performed by: OBSTETRICS & GYNECOLOGY

## 2025-03-22 PROCEDURE — 10907ZC DRAINAGE OF AMNIOTIC FLUID, THERAPEUTIC FROM PRODUCTS OF CONCEPTION, VIA NATURAL OR ARTIFICIAL OPENING: ICD-10-PCS | Performed by: OBSTETRICS & GYNECOLOGY

## 2025-03-22 PROCEDURE — 25010000002 LIDOCAINE PF 1% 1 % SOLUTION: Performed by: OBSTETRICS & GYNECOLOGY

## 2025-03-22 RX ORDER — ONDANSETRON 4 MG/1
4 TABLET, ORALLY DISINTEGRATING ORAL EVERY 8 HOURS PRN
Status: DISCONTINUED | OUTPATIENT
Start: 2025-03-22 | End: 2025-03-24 | Stop reason: HOSPADM

## 2025-03-22 RX ORDER — HYDROCORTISONE 25 MG/G
1 CREAM TOPICAL AS NEEDED
Status: DISCONTINUED | OUTPATIENT
Start: 2025-03-22 | End: 2025-03-24 | Stop reason: HOSPADM

## 2025-03-22 RX ORDER — SODIUM CHLORIDE 0.9 % (FLUSH) 0.9 %
1-10 SYRINGE (ML) INJECTION AS NEEDED
Status: DISCONTINUED | OUTPATIENT
Start: 2025-03-22 | End: 2025-03-24 | Stop reason: HOSPADM

## 2025-03-22 RX ORDER — CARBOPROST TROMETHAMINE 250 UG/ML
250 INJECTION, SOLUTION INTRAMUSCULAR
Status: DISCONTINUED | OUTPATIENT
Start: 2025-03-22 | End: 2025-03-22

## 2025-03-22 RX ORDER — LIDOCAINE HYDROCHLORIDE 10 MG/ML
80 INJECTION, SOLUTION EPIDURAL; INFILTRATION; INTRACAUDAL; PERINEURAL ONCE
Status: DISCONTINUED | OUTPATIENT
Start: 2025-03-22 | End: 2025-03-22

## 2025-03-22 RX ORDER — TRANEXAMIC ACID 10 MG/ML
1000 INJECTION, SOLUTION INTRAVENOUS ONCE AS NEEDED
Status: DISCONTINUED | OUTPATIENT
Start: 2025-03-22 | End: 2025-03-22

## 2025-03-22 RX ORDER — CALCIUM CARBONATE 500 MG/1
2 TABLET, CHEWABLE ORAL 3 TIMES DAILY PRN
Status: DISCONTINUED | OUTPATIENT
Start: 2025-03-22 | End: 2025-03-24 | Stop reason: HOSPADM

## 2025-03-22 RX ORDER — LIDOCAINE HYDROCHLORIDE 10 MG/ML
30 INJECTION, SOLUTION EPIDURAL; INFILTRATION; INTRACAUDAL; PERINEURAL ONCE
Status: COMPLETED | OUTPATIENT
Start: 2025-03-22 | End: 2025-03-22

## 2025-03-22 RX ORDER — IBUPROFEN 600 MG/1
600 TABLET, FILM COATED ORAL EVERY 6 HOURS PRN
Status: DISCONTINUED | OUTPATIENT
Start: 2025-03-22 | End: 2025-03-24 | Stop reason: HOSPADM

## 2025-03-22 RX ORDER — DOCUSATE SODIUM 100 MG/1
100 CAPSULE, LIQUID FILLED ORAL 2 TIMES DAILY
Status: DISCONTINUED | OUTPATIENT
Start: 2025-03-22 | End: 2025-03-24 | Stop reason: HOSPADM

## 2025-03-22 RX ORDER — OXYTOCIN/0.9 % SODIUM CHLORIDE 30/500 ML
999 PLASTIC BAG, INJECTION (ML) INTRAVENOUS ONCE
Status: COMPLETED | OUTPATIENT
Start: 2025-03-22 | End: 2025-03-22

## 2025-03-22 RX ORDER — HYDROCODONE BITARTRATE AND ACETAMINOPHEN 5; 325 MG/1; MG/1
1 TABLET ORAL EVERY 4 HOURS PRN
Status: DISCONTINUED | OUTPATIENT
Start: 2025-03-22 | End: 2025-03-24 | Stop reason: HOSPADM

## 2025-03-22 RX ORDER — FENTANYL CITRATE 50 UG/ML
INJECTION, SOLUTION INTRAMUSCULAR; INTRAVENOUS AS NEEDED
Status: DISCONTINUED | OUTPATIENT
Start: 2025-03-22 | End: 2025-03-22 | Stop reason: SURG

## 2025-03-22 RX ORDER — OXYTOCIN/0.9 % SODIUM CHLORIDE 30/500 ML
125 PLASTIC BAG, INJECTION (ML) INTRAVENOUS ONCE AS NEEDED
Status: COMPLETED | OUTPATIENT
Start: 2025-03-22 | End: 2025-03-22

## 2025-03-22 RX ORDER — OXYTOCIN/0.9 % SODIUM CHLORIDE 30/500 ML
250 PLASTIC BAG, INJECTION (ML) INTRAVENOUS CONTINUOUS
Status: DISPENSED | OUTPATIENT
Start: 2025-03-22 | End: 2025-03-22

## 2025-03-22 RX ORDER — PRENATAL VIT/IRON FUM/FOLIC AC 27MG-0.8MG
1 TABLET ORAL DAILY
Status: DISCONTINUED | OUTPATIENT
Start: 2025-03-22 | End: 2025-03-24 | Stop reason: HOSPADM

## 2025-03-22 RX ORDER — DIPHENHYDRAMINE HCL 25 MG
25 CAPSULE ORAL NIGHTLY PRN
Status: DISCONTINUED | OUTPATIENT
Start: 2025-03-22 | End: 2025-03-24 | Stop reason: HOSPADM

## 2025-03-22 RX ORDER — METHYLERGONOVINE MALEATE 0.2 MG/ML
200 INJECTION INTRAVENOUS ONCE AS NEEDED
Status: DISCONTINUED | OUTPATIENT
Start: 2025-03-22 | End: 2025-03-22

## 2025-03-22 RX ORDER — BISACODYL 10 MG
10 SUPPOSITORY, RECTAL RECTAL DAILY PRN
Status: DISCONTINUED | OUTPATIENT
Start: 2025-03-23 | End: 2025-03-24 | Stop reason: HOSPADM

## 2025-03-22 RX ORDER — HYDROCODONE BITARTRATE AND ACETAMINOPHEN 10; 325 MG/1; MG/1
1 TABLET ORAL EVERY 4 HOURS PRN
Status: DISCONTINUED | OUTPATIENT
Start: 2025-03-22 | End: 2025-03-24 | Stop reason: HOSPADM

## 2025-03-22 RX ORDER — ACETAMINOPHEN 325 MG/1
650 TABLET ORAL EVERY 6 HOURS PRN
Status: DISCONTINUED | OUTPATIENT
Start: 2025-03-22 | End: 2025-03-24 | Stop reason: HOSPADM

## 2025-03-22 RX ORDER — MISOPROSTOL 200 UG/1
800 TABLET ORAL ONCE AS NEEDED
Status: DISCONTINUED | OUTPATIENT
Start: 2025-03-22 | End: 2025-03-22

## 2025-03-22 RX ADMIN — Medication 10 ML/HR: at 04:32

## 2025-03-22 RX ADMIN — PENICILLIN G 3 MILLION UNITS: 3000000 INJECTION, SOLUTION INTRAVENOUS at 06:35

## 2025-03-22 RX ADMIN — SODIUM CHLORIDE, SODIUM LACTATE, POTASSIUM CHLORIDE, CALCIUM CHLORIDE 125 ML/HR: 20; 30; 600; 310 INJECTION, SOLUTION INTRAVENOUS at 02:07

## 2025-03-22 RX ADMIN — HYDROCODONE BITARTRATE AND ACETAMINOPHEN 1 TABLET: 5; 325 TABLET ORAL at 09:02

## 2025-03-22 RX ADMIN — DOCUSATE SODIUM 100 MG: 100 CAPSULE, LIQUID FILLED ORAL at 22:37

## 2025-03-22 RX ADMIN — LIDOCAINE HYDROCHLORIDE 4 ML: 10; .005 INJECTION, SOLUTION EPIDURAL; INFILTRATION; INTRACAUDAL; PERINEURAL at 04:28

## 2025-03-22 RX ADMIN — LIDOCAINE HYDROCHLORIDE 3.5 ML: 10; .005 INJECTION, SOLUTION EPIDURAL; INFILTRATION; INTRACAUDAL; PERINEURAL at 02:02

## 2025-03-22 RX ADMIN — Medication 10 ML/HR: at 02:06

## 2025-03-22 RX ADMIN — Medication: at 18:40

## 2025-03-22 RX ADMIN — Medication 250 ML/HR: at 08:14

## 2025-03-22 RX ADMIN — FENTANYL CITRATE 50 MCG: 50 INJECTION, SOLUTION INTRAMUSCULAR; INTRAVENOUS at 02:45

## 2025-03-22 RX ADMIN — Medication 999 ML/HR: at 07:58

## 2025-03-22 RX ADMIN — Medication 125 ML/HR: at 09:15

## 2025-03-22 RX ADMIN — PENICILLIN G 3 MILLION UNITS: 3000000 INJECTION, SOLUTION INTRAVENOUS at 02:14

## 2025-03-22 RX ADMIN — LIDOCAINE HYDROCHLORIDE 30 ML: 10 INJECTION, SOLUTION EPIDURAL; INFILTRATION; INTRACAUDAL; PERINEURAL at 08:31

## 2025-03-22 NOTE — PLAN OF CARE
Goal Outcome Evaluation:              Outcome Evaluation: Pt G1 IOL for marginal cord insertion with pitocin. Pt received an epidural after AROM. Pt was unable to get comfortable on the left side. Multiple attempts to redose were made. Epidural replaced, however, there was no relief on the left side. Pt currently using nitrous, along with the epidural. VSS.

## 2025-03-22 NOTE — LACTATION NOTE
Lactation Consult Note    P1, T baby - new admission. Rounded on mom at this time. She asked for help with BF. Reports baby has been very sleepy. Assisted mom with waking up baby and latching her on the right breast in a football position. Educated PT on starting nipple to nose to achieve deep latch and baby was able to do it after few attempts and some suck training. She is latching well and has a good jaw rotation, but is falling asleep. Educated mom on different ways to keep baby awake during BF.  Encouraged her to BF baby every 2-3 hours for 15 min on each breast. Educated on the importance of deep latch, hand expression, how to know if infant is getting enough and burping baby between breasts. Mother is able to express colostrum easily. She denies any questions. Mom has PBP. Encouraged to call if needing further help.         Evaluation Completed: 3/22/2025 17:43 EDT  Patient Name: Ivis Madrid  :  2000  MRN:  [id number]     REFERRAL  INFORMATION:                          Date of Referral: 25   Person Making Referral: patient, lactation consultant  Maternal Reason for Referral: latch difficulty  Infant Reason for Referral: sleepy, other (see comments) (recessed chin)    DELIVERY HISTORY:        Skin to skin initiation date/time: 3/22/2025 7:53 AM  Skin to skin end date/time: 3/22/2025 8:00 AM       MATERNAL ASSESSMENT:  Breast Size Issue: yes, bilateral (large  and heavy) (25)  Breast Shape: Bilateral:, pendulous (25)  Breast Density: Bilateral:, soft (25)  Areola: Bilateral:, dense (25)  Nipples: Bilateral:, short, retracting (25)                INFANT ASSESSMENT:  Information for the patient's :  Claudio Madrid [[id number]]   No past medical history on file.  Feeding Readiness Cues: sleeping (25)     Feeding Tolerance/Success: arousal required, sleepy, strong suck, suck inconsistent (25)              Feeding  Interventions: arousal required, jaw supported, latch assistance provided, lips stroked, sucking promoted (25)              Breastfeeding: breastfeeding, right side only (25)  Infant Positioning: clutch/football (25)        Effective Latch During Feeding: yes (25)  Suck/Swallow/Breathing Coordination: present (25)  Signs of Milk Transfer: deep jaw excursions noted (25)      Latch: 1-->repeated attempts, holds nipple in mouth, stimulate to suck (25)  Audible Swallowin-->a few with stimulation (25)  Type of Nipple: 2-->everted (after stimulation) (25)  Comfort (Breast/Nipple): 2-->soft/nontender (25)  Hold (Positioning): 1-->minimal assist, teach one side, mother does other, staff holds (25)  Latch Score: 7 (25)                   MATERNAL INFANT FEEDING:     Maternal Emotional State: receptive, relaxed (25)  Infant Positioning: clutch/football (25)   Signs of Milk Transfer: deep jaw excursions noted (25)  Pain with Feeding: no (25)           Milk Ejection Reflex: present (25)           Latch Assistance: minimal assistance (25)                               EQUIPMENT TYPE:                                 BREAST PUMPING:          LACTATION REFERRALS:

## 2025-03-22 NOTE — L&D DELIVERY NOTE
[facility]   Vaginal Delivery Note    Patient Name: Ivis Madrid  : 2000  MRN: [id number]    Date of Delivery: 3/22/2025    Diagnosis     Pre & Post-Delivery:  Intrauterine pregnancy at 39w1d  Labor status: Induced Onset of Labor    Pregnancy    Supervision of normal pregnancy    Marginal insertion of umbilical cord affecting management of mother    GBS (group B Streptococcus carrier), +RV culture, currently pregnant             Problem List    Transfer to Postpartum    Review the Delivery Report for details.     Delivery     Delivery: Vaginal, Spontaneous    YOB: 2027   Time of Birth:  Gestational Age 7:53 AM  39w1d     Anesthesia: Epidural    Delivering clinician: Tressa Montgomery   Forceps?   No   Vacuum? No    Shoulder dystocia present: No        Delivery narrative:  Ivis Madrid was admitted for term induction of labor. She received penicillin for GBS ppx and pitocin was started. AROM was performed after adequate penicillin.  She entered an active phase of labor and became complete and +1 station.  She had received an epidural and had been replaced but unfortunately it was not working well.  She also used nitrous for pain relief.  Pushing for a little less than an hour, she delivered a vigorous female  in right occiput anterior position.  The shoulders and body delivered over a supported perineum.  The  was handed to mom for skin to skin.  Delayed cord clamping was performed.  The placenta delivered with gentle cord traction.  A marginal versus velamentous cord insertion was noted.  A posterior midline second-degree laceration was repaired in usual fashion with 3-0 Vicryl.  There was a small right sidewall laceration that was repaired with 3-0 Vicryl as well.  Her bleeding was minimal.  Mother and baby were doing well at the conclusion of the delivery.      Infant     Findings: female infant     Infant observations: Weight: No birth weight on file.  Length:    "in  Observations/Comments:        Apgars: 8  @ 1 minute /    9  @ 5 minutes   Infant Name:   Sarah     Placenta & Cord         Placenta delivered  Spontaneous at   3/22/2025  7:58 AM    Cord: 3 vessels present.   Nuchal Cord?  no   Cord blood obtained: Yes   Cord gases obtained:  No   Cord gas results: Venous:  No results found for: \"PHCVEN\", \"BECVEN\"    Arterial:  No results found for: \"PHCART\", \"BECART\"       Repair     Episiotomy: Not recorded      Lacerations: Yes  Laceration Information  Laceration Repaired?   Perineal:       Periurethral:       Labial:       Sulcus:       Vaginal:       Cervical:         Suture used for repair: 3-0 Vicryl     Estimated Blood Loss:  200 mL     Quantitative Blood Loss:  Pending            Complications     none    Disposition     Mother to Mother Baby/Postpartum  in stable condition currently.  Baby to remains with mom  in stable condition currently.    Labs:    Lab Results   Component Value Date    RUBELLAABIGG 1.17 08/07/2024    VZIGG 673 08/07/2024           Tressa Montgomery MD  03/22/25  08:26 EDT    "

## 2025-03-22 NOTE — PLAN OF CARE
Problem: Adult Inpatient Plan of Care  Goal: Plan of Care Review  Outcome: Progressing  Flowsheets (Taken 3/22/2025 1104)  Outcome Evaluation: Pt was complete upon rn arrival, pain was not well controlled but was able to push well with contractions.  Spontaneous vaginal delivery approx 0753.  Second degree tear was repaired per Dr Montgomery.  Skin to skin for an hour and pain well controlled with meds, vs wnl.  Transferred to mother baby with no complications.  Plan of Care Reviewed With: patient  Goal: Patient-Specific Goal (Individualized)  Outcome: Progressing  Flowsheets (Taken 3/22/2025 0615 by David Riley, RN)  Patient/Family-Specific Goals (Include Timeframe): Healthy mom and baby at discharge  Individualized Care Needs:   Education   pain control   lactation consultation  Anxieties, Fears or Concerns:   First baby   pain control  Goal: Absence of Hospital-Acquired Illness or Injury  Outcome: Progressing  Goal: Optimal Comfort and Wellbeing  Outcome: Progressing  Goal: Readiness for Transition of Care  Outcome: Progressing     Problem: Labor  Goal: Hemostasis  Outcome: Progressing  Goal: Stable Fetal Wellbeing  Outcome: Progressing  Goal: Effective Progression to Delivery  Outcome: Progressing  Goal: Absence of Infection Signs and Symptoms  Outcome: Progressing  Goal: Acceptable Pain Control  Outcome: Progressing  Goal: Normal Uterine Contraction Pattern  Outcome: Progressing     Problem: Skin Injury Risk Increased  Goal: Skin Health and Integrity  Outcome: Progressing  Intervention: Optimize Skin Protection  Recent Flowsheet Documentation  Taken 3/22/2025 1015 by Laurita Sarmiento RN  Activity Management: bedrest  Taken 3/22/2025 0945 by Laurita Sarmiento RN  Activity Management: bedrest  Taken 3/22/2025 0824 by Laurita Sarmiento RN  Activity Management: bedrest   Goal Outcome Evaluation:  Plan of Care Reviewed With: patient           Outcome Evaluation: Pt was complete upon rn arrival, pain was not well  controlled but was able to push well with contractions.  Spontaneous vaginal delivery approx 0753.  Second degree tear was repaired per Dr Montgomery.  Skin to skin for an hour and pain well controlled with meds, vs wnl.  Transferred to mother baby with no complications.

## 2025-03-22 NOTE — ANESTHESIA PROCEDURE NOTES
Labor Epidural      Patient reassessed immediately prior to procedure    Patient location during procedure: OB  Performed By  Anesthesiologist: Jone Hernandez MD  Preanesthetic Checklist  Completed: patient identified, IV checked, site marked, risks and benefits discussed, surgical consent, monitors and equipment checked, pre-op evaluation and timeout performed  Additional Notes  Attempted unsuccessfully at L3-L4. Ranjit at that level but unable to thread catheter. Successful at L2-L3 as above.   Prep:  Pt Position:sitting  Sterile Tech:gloves, cap, sterile barrier and mask  Prep:chlorhexidine gluconate and isopropyl alcohol  Monitoring:continuous pulse oximetry, EKG and blood pressure monitoring  Epidural Block Procedure:  Approach:midline  Guidance:landmark technique  Location:L2-L3  Needle Type:Tuohy  Needle Gauge:17 G  Loss of Resistance Medium: saline  Loss of Resistance: 7cm  Cath Depth at skin:13 cm  Paresthesia: none  Aspiration:negative  Test Dose:negative  Number of Attempts: 2  Post Assessment:  Dressing:secured with tape and occlusive dressing applied  Pt Tolerance:patient tolerated the procedure well with no apparent complications  Complications:no

## 2025-03-22 NOTE — ANESTHESIA PREPROCEDURE EVALUATION
Anesthesia Evaluation     Patient summary reviewed and Nursing notes reviewed   NPO Solid Status: > 6 hours  NPO Liquid Status: > 2 hours           Airway   Dental      Pulmonary    (-) COPD, asthma, not a smoker  Cardiovascular     (-) past MI, dysrhythmias, angina      Neuro/Psych  (+) psychiatric history Anxiety  (-) seizures, CVA  GI/Hepatic/Renal/Endo    (+) obesity  (-) GERD, liver disease, no renal disease, diabetes, no thyroid disorder    Musculoskeletal     Abdominal    Substance History      OB/GYN    (+) Pregnant        Other                    Anesthesia Plan    ASA 2     epidural     (39w1d)    Anesthetic plan, risks, benefits, and alternatives have been provided, discussed and informed consent has been obtained with: patient.    CODE STATUS:    Code Status (Patient has no pulse and is not breathing): CPR (Attempt to Resuscitate)  Medical Interventions (Patient has pulse or is breathing): Full Support  Level Of Support Discussed With: Patient

## 2025-03-22 NOTE — ANESTHESIA PROCEDURE NOTES
Labor Epidural      Patient reassessed immediately prior to procedure    Performed By  Anesthesiologist: Jone Hernandez MD  Preanesthetic Checklist  Completed: patient identified, IV checked, site marked, risks and benefits discussed, surgical consent, monitors and equipment checked, pre-op evaluation and timeout performed  Additional Notes  Pt's first epidural was one-sided. I attempted dose and position adjustments without any improvement in symptoms. D/w pt and decided to replace epidural. Initial epidural catheter removed. Tip intact. Attempted placement of new epidural at L4-L5 unsuccessfully. Successful attempt at L2-L3 as above.   Prep:  Pt Position:sitting  Sterile Tech:gloves, cap, sterile barrier and mask  Prep:chlorhexidine gluconate and isopropyl alcohol  Monitoring:continuous pulse oximetry, EKG and blood pressure monitoring  Epidural Block Procedure:  Approach:midline  Guidance:landmark technique  Location:L2-L3  Needle Gauge:17 G  Loss of Resistance Medium: saline  Loss of Resistance: 8cm  Cath Depth at skin:14 cm  Paresthesia: none  Aspiration:negative  Test Dose:negative  Number of Attempts: 2  Post Assessment:  Dressing:secured with tape and occlusive dressing applied  Pt Tolerance:patient tolerated the procedure well with no apparent complications  Complications:no

## 2025-03-22 NOTE — ANESTHESIA POSTPROCEDURE EVALUATION
Patient: Ivis Madrid    Procedure Summary       Date: 03/22/25 Room / Location:     Anesthesia Start: 0141 Anesthesia Stop: 0753    Procedure: LABOR ANALGESIA Diagnosis:     Scheduled Providers:  Provider: Jone Hernandez MD    Anesthesia Type: epidural ASA Status: 2            Anesthesia Type: epidural    Vitals  Vitals Value Taken Time   /54 03/22/25 08:15   Temp 36.5 °C (97.7 °F) 03/22/25 06:35   Pulse 105 03/22/25 08:15   Resp     SpO2 100 % 03/22/25 07:09   Vitals shown include unfiled device data.        Post Anesthesia Care and Evaluation      Comments: Anesthesia present throughout labor and delivery

## 2025-03-22 NOTE — H&P
[facility]  Obstetric History and Physical    Chief Complaint   Patient presents with    Scheduled Induction     Marginal cord insertion. Off an on contractions. Denies vaginal bleeding. +FM       Subjective     Patient is a 25 y.o. female  currently at 39w1d, who presents for term induction. She has some uncomfortable contractions with the pitocin, desires to try nitrous.    Her prenatal care is complicated by  marginal cord insertion .  Her previous obstetric/gynecological history is non-contributory.    The following portions of the patients history were reviewed and updated as appropriate: current medications, allergies, past medical history, past surgical history, problem list, and ob hx  .       Prenatal Information:       Prenatal Labs for [facility] Patients  Lab Results   Component Value Date    HGB 9.7 (L) 2025    HEPBSAG Negative 2024    ABSCRN Negative 2025    RCM6GJS1 Non Reactive 2024       Prenatal Labs -- transcribed from paper records by Nurse  Lab Results   Component Value Date    ABO A 2025    RH Positive 2025    HEPBSAG Negative 2024    RPR Non Reactive 2024    [id number] Non Reactive 2024           Past OB History:       OB History    Para Term  AB Living   1 0 0 0 0 0   SAB IAB Ectopic Molar Multiple Live Births   0 0 0 0 0 0      # Outcome Date GA Lbr Jose Enrique/2nd Weight Sex Type Anes PTL Lv   1 Current                Past Medical History: Past Medical History:   Diagnosis Date    Anxiety     Heart burn     PCOS (polycystic ovarian syndrome)     PMS (premenstrual syndrome) 2008 first period    Polycystic ovary syndrome     Rough estimate for age    Pregnancy 3/5/2025      Past Surgical History Past Surgical History:   Procedure Laterality Date    CHOLECYSTECTOMY WITH INTRAOPERATIVE CHOLANGIOGRAM N/A 2022    Procedure: CHOLECYSTECTOMY LAPAROSCOPIC INTRAOPERATIVE CHOLANGIOGRAM;  Surgeon:  Toni Villatoro Jr., MD;  Location: [facility] MAIN OR;  Service: General;  Laterality: N/A;    ERCP N/A 01/03/2022    Procedure: ENDOSCOPIC RETROGRADE CHOLANGIOPANCREATOGRAPHY with sphincterotomy, cholangiogram, and balloon sweep and common bile duct;  Surgeon: Robin Mg MD;  Location: [facility] ENDOSCOPY;  Service: Gastroenterology;  Laterality: N/A;  Pre op: Common Bile Duct Obstruction   Post op: CBD stones     INNER EAR SURGERY      KNEE SURGERY Right     LAPAROSCOPIC CHOLECYSTECTOMY  2022    WISDOM TOOTH EXTRACTION  2016      Family History: Family History   Problem Relation Age of Onset    Diabetes Father     Arthritis Father     Arthritis Mother     Asthma Mother     Deep vein thrombosis Sister     Heart disease Paternal Grandfather     Colon cancer Neg Hx     Colon polyps Neg Hx     Crohn's disease Neg Hx     Irritable bowel syndrome Neg Hx     Ulcerative colitis Neg Hx     Breast cancer Neg Hx     Ovarian cancer Neg Hx     Uterine cancer Neg Hx     Congenital heart disease Neg Hx       Social History:  reports that she has never smoked. She has never used smokeless tobacco.   reports that she does not currently use alcohol.   reports that she does not currently use drugs after having used the following drugs: Marijuana. Frequency: 0.80 times per week.           Objective       Vital Signs Range for the last 24 hours  Temperature: Temp:  [99 °F (37.2 °C)] 99 °F (37.2 °C)   Temp Source: Temp src: Oral   BP: BP: (117-126)/(71-81) 117/71   Pulse: Heart Rate:  [] 98   Respirations: Resp:  [16] 16   SPO2: SpO2:  [95 %] 95 %   O2 Amount (l/min):     O2 Devices     Weight: Weight:  [113 kg (248 lb 9.6 oz)] 113 kg (248 lb 9.6 oz)     Physical Examination: General appearance - alert, well appearing, and in no distress  Chest - no tachypnea, retractions or cyanosis  Abdomen - fundus soft, nontender  Pelvic - adequate pelvimetry  Extremities - no pedal edema noted  Skin - normal coloration and turgor, no  rashes, no suspicious skin lesions noted    Presentation: vertex   Cervix: Exam by: Method: sterile vaginal exam performed   Dilation: Cervical Dilation (cm): 3-4   Effacement: Cervical Effacement: 80   Station: Fetal Station: -2       Fetal Heart Rate Assessment   Method: Fetal HR Assessment Method: external   Beats/min: Fetal HR (beats/min): 135   Baseline: Fetal HR Baseline: normal range   Varibility: Fetal HR Variability: moderate (amplitude range 6 to 25 bpm)   Accels: Fetal HR Accelerations: greater than/equal to 15 bpm, lasting at least 15 seconds   Decels: Fetal HR Decelerations: absent   Tracing Category:       Uterine Assessment   Method: Method: external tocotransducer   Frequency (min): Contraction Frequency (Minutes): 2-8.5   Ctx Count in 10 min:     Duration:     Intensity: Contraction Intensity: mild by palpation   Intensity by IUPC:     Resting Tone: Uterine Resting Tone: soft by palpation   Resting Tone by IUPC:     [place]olski Units:       Results from last 7 days   Lab Units 03/21/25  2230   WBC 10*3/mm3 9.68   HEMOGLOBIN g/dL 9.7*   HEMATOCRIT % 30.4*   PLATELETS 10*3/mm3 309       Assessment & Plan       Pregnancy    Supervision of normal pregnancy    Marginal insertion of umbilical cord affecting management of mother    GBS (group B Streptococcus carrier), +RV culture, currently pregnant        Assessment:  1.  Intrauterine pregnancy at 39w1d weeks gestation with reactive fetal status.    2.  Term elective induction  3.  Obstetrical history benign  4.  GBS status: positive      Plan:  1. Pitocin and AROM performed just now with clear fluid. She desires to try nitrous, may want epidural. PCN for GBS ppx  2. Plan of care has been reviewed with patient and her partner  3.  Risks, benefits of treatment plan have been discussed.  4.  All questions have been answered.        Tressa Montgomery MD  03/08/2025  01:03 EDT

## 2025-03-23 PROBLEM — Z34.90 PREGNANCY: Status: RESOLVED | Noted: 2025-03-05 | Resolved: 2025-03-23

## 2025-03-23 LAB
BASOPHILS # BLD AUTO: 0.04 10*3/MM3 (ref 0–0.2)
BASOPHILS NFR BLD AUTO: 0.3 % (ref 0–1.5)
DEPRECATED RDW RBC AUTO: 39.6 FL (ref 37–54)
EOSINOPHIL # BLD AUTO: 0.03 10*3/MM3 (ref 0–0.4)
EOSINOPHIL NFR BLD AUTO: 0.2 % (ref 0.3–6.2)
ERYTHROCYTE [DISTWIDTH] IN BLOOD BY AUTOMATED COUNT: 13.4 % (ref 12.3–15.4)
HCT VFR BLD AUTO: 26.3 % (ref 34–46.6)
HGB BLD-MCNC: 8.7 G/DL (ref 12–15.9)
IMM GRANULOCYTES # BLD AUTO: 0.05 10*3/MM3 (ref 0–0.05)
IMM GRANULOCYTES NFR BLD AUTO: 0.4 % (ref 0–0.5)
LYMPHOCYTES # BLD AUTO: 2.44 10*3/MM3 (ref 0.7–3.1)
LYMPHOCYTES NFR BLD AUTO: 18.8 % (ref 19.6–45.3)
MCH RBC QN AUTO: 27.1 PG (ref 26.6–33)
MCHC RBC AUTO-ENTMCNC: 33.1 G/DL (ref 31.5–35.7)
MCV RBC AUTO: 81.9 FL (ref 79–97)
MONOCYTES # BLD AUTO: 1.05 10*3/MM3 (ref 0.1–0.9)
MONOCYTES NFR BLD AUTO: 8.1 % (ref 5–12)
NEUTROPHILS NFR BLD AUTO: 72.2 % (ref 42.7–76)
NEUTROPHILS NFR BLD AUTO: 9.34 10*3/MM3 (ref 1.7–7)
NRBC BLD AUTO-RTO: 0 /100 WBC (ref 0–0.2)
PLATELET # BLD AUTO: 285 10*3/MM3 (ref 140–450)
PMV BLD AUTO: 12.2 FL (ref 6–12)
RBC # BLD AUTO: 3.21 10*6/MM3 (ref 3.77–5.28)
WBC NRBC COR # BLD AUTO: 12.95 10*3/MM3 (ref 3.4–10.8)

## 2025-03-23 PROCEDURE — 85025 COMPLETE CBC W/AUTO DIFF WBC: CPT | Performed by: OBSTETRICS & GYNECOLOGY

## 2025-03-23 RX ORDER — FERROUS SULFATE 325(65) MG
325 TABLET ORAL
Status: DISCONTINUED | OUTPATIENT
Start: 2025-03-23 | End: 2025-03-24 | Stop reason: HOSPADM

## 2025-03-23 RX ADMIN — DOCUSATE SODIUM 100 MG: 100 CAPSULE, LIQUID FILLED ORAL at 10:40

## 2025-03-23 RX ADMIN — DOCUSATE SODIUM 100 MG: 100 CAPSULE, LIQUID FILLED ORAL at 20:27

## 2025-03-23 RX ADMIN — PRENATAL VITAMINS-IRON FUMARATE 27 MG IRON-FOLIC ACID 0.8 MG TABLET 1 TABLET: at 10:40

## 2025-03-23 RX ADMIN — FERROUS SULFATE TAB 325 MG (65 MG ELEMENTAL FE) 325 MG: 325 (65 FE) TAB at 10:40

## 2025-03-23 NOTE — PROGRESS NOTES
[facility]   Obstetrics and Gynecology     3/23/2025    Name:Ivis Madrid   MR#:[id number]    Vaginal Delivery Progress Note    HD#2    Subjective   Postpartum Day 1: 25 y.o. female  delivered at 39w1d  delivered a female infant.     The patient feels well.  Her pain is controlled.    She is ambulating well.  Patient describes her bleeding as moderate lochia.    Breastfeeding/bottle:  The patient is currently breastfeeding.    Patient Active Problem List   Diagnosis    Calculus of gallbladder without cholecystitis without obstruction    Increased BMI    Supervision of normal pregnancy    Marginal insertion of umbilical cord affecting management of mother    Maternal anemia in pregnancy, antepartum    GBS (group B Streptococcus carrier), +RV culture, currently pregnant     (normal spontaneous vaginal delivery)    Postpartum anemia       Objective   Vital Signs Range for the last 24 hours  Temp: Temp:  [97.3 °F (36.3 °C)-99.2 °F (37.3 °C)] 97.3 °F (36.3 °C) Temp src: Oral   BP: BP: (116-132)/(69-81) 126/81        Pulse: Heart Rate:  [91-98] 94  RR: Resp:  [14-16] 16  Weight: 113 kg (248 lb 9.6 oz)  BMI:  Body mass index is 42.67 kg/m².    Results from last 7 days   Lab Units 25  0537 25  2230   WBC 10*3/mm3 12.95* 9.68   HEMOGLOBIN g/dL 8.7* 9.7*   HEMATOCRIT % 26.3* 30.4*   PLATELETS 10*3/mm3 285 309     Results from last 7 days   Lab Units 25  2230   SODIUM mmol/L 136   POTASSIUM mmol/L 3.9   CHLORIDE mmol/L 107   CO2 mmol/L 18.8*   BUN mg/dL 10   CREATININE mg/dL 0.65   CALCIUM mg/dL 9.0   ALK PHOS U/L 153*   ALT (SGPT) U/L 8   AST (SGOT) U/L 14   GLUCOSE mg/dL 79       Physical Exam  Vitals and nursing note reviewed.   Constitutional:       General: She is not in acute distress.     Appearance: Normal appearance.   Cardiovascular:      Pulses: Normal pulses.   Pulmonary:      Effort: Pulmonary effort is normal.   Abdominal:      General: There is no distension.       Palpations: Abdomen is soft.      Tenderness: There is no abdominal tenderness. There is no guarding or rebound.   Genitourinary:     Comments: Uterus firm and below umbilicus  Musculoskeletal:         General: No swelling or tenderness.      Right lower leg: No edema.      Left lower leg: No edema.   Neurological:      Mental Status: She is alert and oriented to person, place, and time.   Psychiatric:         Mood and Affect: Mood normal.         Behavior: Behavior normal.         Assessment/Plan   1.  PPD# 1    Supervision of normal pregnancy    Marginal insertion of umbilical cord affecting management of mother    GBS (group B Streptococcus carrier), +RV culture, currently pregnant     (normal spontaneous vaginal delivery)    Postpartum anemia      Plan:   Continue routine postpartum care  Anemia - po iron and CBC in AM ordered, discussed option of IV iron if she does not tolerate po iron    Teresa Becker MD  3/23/2025 10:19 EDT

## 2025-03-23 NOTE — LACTATION NOTE
This note was copied from a baby's chart.  Baby is currently latched to left breast in football hold and started at 1027.  Mom reports baby did well and had a deep latch and was not painful. Now, is feeling some pinching.  Baby appears sleepy now and may have become shallow at end of feeding. Showed how to break latch with finger then attempted to re latch but baby fell asleep.  Educated on importance of a deep latch, how to tell if baby is getting enough, and expected output for day 2.  Recommended to pump if baby is sleepy or does not latch well and to give EBM.  Gave a manual pump and educated on use, cleaning,  milk collection and storage, and syringe feeding. Mom reports hand expressed at om and has 12 syringes of colostrum in freezer at home so is familiar with expressing.  Mom pumped with HP using a 24 mm flange and was comfortable and expressed easily.  Encouraged to call for assist as needed.

## 2025-03-23 NOTE — PLAN OF CARE
Goal Outcome Evaluation:  Plan of Care Reviewed With: patient     VSS, pain controlled. Fundus and lochia WNL. Ambulating independently, voiding spontaneously. Breastfeeding well. No concerns at this time.       Progress: improving

## 2025-03-23 NOTE — LACTATION NOTE
This note was copied from a baby's chart.  Baby is in nursery for 24 hr testing.  Mom reports baby latches well, better on the left side, but is sleepy during feedings.  Encouraged to call LC for assist next feeding.  Number is on whiteboard.

## 2025-03-24 VITALS
TEMPERATURE: 97.8 F | RESPIRATION RATE: 18 BRPM | DIASTOLIC BLOOD PRESSURE: 75 MMHG | HEART RATE: 105 BPM | HEIGHT: 64 IN | WEIGHT: 248.6 LBS | SYSTOLIC BLOOD PRESSURE: 125 MMHG | BODY MASS INDEX: 42.44 KG/M2 | OXYGEN SATURATION: 100 %

## 2025-03-24 LAB
BASOPHILS # BLD AUTO: 0.04 10*3/MM3 (ref 0–0.2)
BASOPHILS NFR BLD AUTO: 0.4 % (ref 0–1.5)
DEPRECATED RDW RBC AUTO: 39.4 FL (ref 37–54)
EOSINOPHIL # BLD AUTO: 0.26 10*3/MM3 (ref 0–0.4)
EOSINOPHIL NFR BLD AUTO: 2.3 % (ref 0.3–6.2)
ERYTHROCYTE [DISTWIDTH] IN BLOOD BY AUTOMATED COUNT: 13.1 % (ref 12.3–15.4)
HCT VFR BLD AUTO: 28.5 % (ref 34–46.6)
HGB BLD-MCNC: 8.9 G/DL (ref 12–15.9)
IMM GRANULOCYTES # BLD AUTO: 0.08 10*3/MM3 (ref 0–0.05)
IMM GRANULOCYTES NFR BLD AUTO: 0.7 % (ref 0–0.5)
LYMPHOCYTES # BLD AUTO: 2.56 10*3/MM3 (ref 0.7–3.1)
LYMPHOCYTES NFR BLD AUTO: 22.6 % (ref 19.6–45.3)
MCH RBC QN AUTO: 26.1 PG (ref 26.6–33)
MCHC RBC AUTO-ENTMCNC: 31.2 G/DL (ref 31.5–35.7)
MCV RBC AUTO: 83.6 FL (ref 79–97)
MONOCYTES # BLD AUTO: 0.9 10*3/MM3 (ref 0.1–0.9)
MONOCYTES NFR BLD AUTO: 7.9 % (ref 5–12)
NEUTROPHILS NFR BLD AUTO: 66.1 % (ref 42.7–76)
NEUTROPHILS NFR BLD AUTO: 7.49 10*3/MM3 (ref 1.7–7)
NRBC BLD AUTO-RTO: 0 /100 WBC (ref 0–0.2)
PLATELET # BLD AUTO: 286 10*3/MM3 (ref 140–450)
PMV BLD AUTO: 12.3 FL (ref 6–12)
RBC # BLD AUTO: 3.41 10*6/MM3 (ref 3.77–5.28)
WBC NRBC COR # BLD AUTO: 11.33 10*3/MM3 (ref 3.4–10.8)

## 2025-03-24 PROCEDURE — 85025 COMPLETE CBC W/AUTO DIFF WBC: CPT | Performed by: STUDENT IN AN ORGANIZED HEALTH CARE EDUCATION/TRAINING PROGRAM

## 2025-03-24 RX ORDER — AMOXICILLIN 250 MG
1 CAPSULE ORAL DAILY
Qty: 60 TABLET | Refills: 0 | Status: SHIPPED | OUTPATIENT
Start: 2025-03-24

## 2025-03-24 RX ORDER — FERROUS SULFATE 325(65) MG
325 TABLET ORAL
Qty: 90 TABLET | Refills: 0 | Status: SHIPPED | OUTPATIENT
Start: 2025-03-24

## 2025-03-24 RX ORDER — ACETAMINOPHEN 500 MG
1000 TABLET ORAL EVERY 6 HOURS PRN
Qty: 60 TABLET | Refills: 0 | Status: SHIPPED | OUTPATIENT
Start: 2025-03-24

## 2025-03-24 RX ORDER — IBUPROFEN 800 MG/1
800 TABLET, FILM COATED ORAL EVERY 8 HOURS PRN
Qty: 30 TABLET | Refills: 0 | Status: SHIPPED | OUTPATIENT
Start: 2025-03-24

## 2025-03-24 RX ADMIN — PRENATAL VITAMINS-IRON FUMARATE 27 MG IRON-FOLIC ACID 0.8 MG TABLET 1 TABLET: at 08:27

## 2025-03-24 RX ADMIN — DOCUSATE SODIUM 100 MG: 100 CAPSULE, LIQUID FILLED ORAL at 08:27

## 2025-03-24 RX ADMIN — FERROUS SULFATE TAB 325 MG (65 MG ELEMENTAL FE) 325 MG: 325 (65 FE) TAB at 08:26

## 2025-03-24 NOTE — PROGRESS NOTES
"Discharge Planning Assessment  [facility]     Patient Name: Ivis Madrid  MRN: [id number]  Today's Date: 3/24/2025    Admit Date: 3/21/2025    Plan: Infant may discharge to mother when medically ready; CSW will follow cord tox. NANO Srinivasan.   Discharge Needs Assessment    No documentation.                  Discharge Plan       Row Name 03/24/25 1152       Plan    Plan Infant may discharge to mother when medically ready; CSW will follow cord tox. NANO Srinivasan.    Plan Comments Mother: Ivis Madrid, MRN: [id number]; infant: Alexandreairl \"Sarah\" Julius, MRN: [id number]. CSW consulted for \"maternal hx THC.\" Of note, mother's UDS was positive for THC prenatally on 8/7. Mother's UDS was not collected on admit. Infant's UDS was missed; cord toxicology sent. CSW met with mother at bedside while father of infant/SO was in the room. Mother gave consent for father to be present during assessment. Mother verified address, phone number, and insurance. Mother reports she understands the process of adding infant to health insurance. Mother reports having a car seat, crib/bassinet, clothes, and diapers for infant. This is mother and father's first baby. Mother reports, maternal great grandparents, maternal grandparents, paternal great grandparents, paternal grandparents, father of infant/SO, and other family members are available for support as needed. Mother shared, \"we are very christopher to have the amount of support we do. We truly have so many people to support us.\" Mother reports infant is following up with [facility] after discharge; mother is comfortable scheduling appointments for infant and has reliable transportation. Mother is not current with WIC. Mother shared, she is unsure if she qualifies for WIC benefits. CSW explained how moms can qualify for WIC during maternity leave if they make half a paycheck/no paycheck due to household income changes. Mother stated, she will follow up with WIC after discharge. CSW spoke to mother " about the Healthy Start program and mother declined a referral today. CSW provided mother with a packet of resources including: WIC, HANDS, Healthy Start, transportation, infant supplies, counseling, online support groups, postpartum mood and anxiety resources, and general community resources. CSW spent time building rapport with mother, and offered validation, support, and encouragement to mother throughout assessment. Mother and father were polite and appropriate, and denied having unmet needs or concerns at this time. CSW will follow cord toxicology and complete mandated reporting to CPS if warranted. NANO Srinivasan.                       Demographic Summary       Row Name 03/24/25 1151       General Information    Admission Type inpatient    Arrived From home    Referral Source nursing    Reason for Consult substance use concerns    General Information Comments Hx of THC use                   Functional Status       Row Name 03/24/25 1151       Functional Status, IADL    Medications independent    Meal Preparation independent    Housekeeping independent    Laundry independent    Shopping independent       Mental Status    General Appearance WDL WDL       Mental Status Summary    Recent Changes in Mental Status/Cognitive Functioning no changes       Employment/    Employment Status employed full-time    Employment/ Comments [employer]                   Psychosocial       Row Name 03/24/25 1152       Behavior WDL    Behavior WDL WDL       Emotion Mood WDL    Emotion/Mood/Affect WDL WDL       Speech WDL    Speech WDL WDL       Perceptual State WDL    Perceptual State WDL WDL       Thought Process WDL    Thought Process WDL WDL       Intellectual Performance WDL    Intellectual Performance WDL WDL       Coping/Stress    Major Change/Loss/Stressor birth    Patient Personal Strengths future/goal oriented;motivated;positive attitude;strong support system    Sources of Support  other family members;parent(s);spouse                   Abuse/Neglect       Row Name 03/24/25 1152       Personal Safety    Physical Signs of Abuse Present no                   Legal    No documentation.                  Substance Abuse       Row Name 03/24/25 1152       Substance Use    Substance Use Comment No UDS mom or infant; cord tox sent.                   Patient Forms    No documentation.                     MALCOLM Quiroz

## 2025-03-24 NOTE — PLAN OF CARE
Goal Outcome Evaluation:  Plan of Care Reviewed With: patient        Progress: improving     VSS, fundus and lochia WDL, up ad joelle, voiding without difficulty,   Problem: Adult Inpatient Plan of Care  Goal: Plan of Care Review  Outcome: Progressing  Flowsheets (Taken 3/24/2025 0651)  Progress: improving  Plan of Care Reviewed With: patient  Goal: Patient-Specific Goal (Individualized)  Outcome: Progressing  Goal: Absence of Hospital-Acquired Illness or Injury  Outcome: Progressing  Intervention: Identify and Manage Fall Risk  Recent Flowsheet Documentation  Taken 3/24/2025 0616 by Kayce Riddle RN  Safety Promotion/Fall Prevention: safety round/check completed  Taken 3/24/2025 0405 by Kayce Riddle RN  Safety Promotion/Fall Prevention: safety round/check completed  Taken 3/24/2025 0211 by Kayce Riddle RN  Safety Promotion/Fall Prevention: safety round/check completed  Taken 3/24/2025 0027 by Kayce Riddle RN  Safety Promotion/Fall Prevention: safety round/check completed  Taken 3/23/2025 2224 by Kayce Riddle RN  Safety Promotion/Fall Prevention: safety round/check completed  Taken 3/23/2025 2027 by Kayce Riddle RN  Safety Promotion/Fall Prevention: safety round/check completed  Goal: Optimal Comfort and Wellbeing  Outcome: Progressing  Intervention: Provide Person-Centered Care  Recent Flowsheet Documentation  Taken 3/23/2025 2027 by Kayce Riddle RN  Trust Relationship/Rapport:   care explained   choices provided   questions answered   questions encouraged   thoughts/feelings acknowledged  Goal: Readiness for Transition of Care  Outcome: Progressing     Problem: Labor  Goal: Hemostasis  Outcome: Progressing  Goal: Stable Fetal Wellbeing  Outcome: Progressing  Goal: Effective Progression to Delivery  Outcome: Progressing  Goal: Absence of Infection Signs and Symptoms  Outcome: Progressing  Goal: Acceptable Pain Control  Outcome: Progressing  Goal: Normal Uterine Contraction Pattern  Outcome:  Progressing     Problem: Skin Injury Risk Increased  Goal: Skin Health and Integrity  Outcome: Progressing  Intervention: Optimize Skin Protection  Recent Flowsheet Documentation  Taken 3/23/2025 2027 by Kayce Riddle, RN  Activity Management: up ad joelle   working on breastfeeding.

## 2025-03-24 NOTE — PLAN OF CARE
Goal Outcome Evaluation:      PP day 2. VSS, up/ambulating, voiding, pain controlled with PO medication. Bleeding WNL. D/c pending.

## 2025-03-24 NOTE — LACTATION NOTE
This note was copied from a baby's chart.  P1 term baby, 49 hrs old. Mom reports baby was cluster feeding last night and kept showing feeding cues after nursing therefore they are supplementing baby with formula and Mom is pumping with hand pump. She has pumped x2 and got 4mls total.   Reviewed milk production, how to know if baby is getting enough milk with breast feeding, expected output,  encouraged pumping every 3hrs if baby is still hungry after nursing and encouraged to call for any assistance or questions.

## 2025-03-24 NOTE — DISCHARGE SUMMARY
[facility]  Delivery Discharge Summary    Primary OB Clinician: Tressa Montgomery MD    Admission Diagnosis:  Active Problems:    Supervision of normal pregnancy    Marginal insertion of umbilical cord affecting management of mother    GBS (group B Streptococcus carrier), +RV culture, currently pregnant     (normal spontaneous vaginal delivery)    Postpartum anemia      Discharge Diagnosis:  Same     Gestational Age: 39w1d    Date of Delivery: 3/22/2025    Delivery Type: Vaginal, Spontaneous     Intrapartum Course: See delivery note for details.    Postpartum Course:  Uncomplicated pp course. Pt is tolerating po well, ambulating and voiding without difficulty.  Pain is well controlled. Bleeding is minimal/ appropriate for pp state.     Physical Exam:    Vitals:   Vitals:    25 2231 25 0845 25 1919 25 0721   BP: 116/76 126/81 117/77 125/75   BP Location: Left arm Left arm Left arm Left arm   Patient Position: Sitting Sitting Sitting Lying   Pulse: 98 94 102 105   Resp: 16  16 18   Temp: 97.8 °F (36.6 °C) 97.3 °F (36.3 °C) 98 °F (36.7 °C) 97.8 °F (36.6 °C)   TempSrc: Oral Oral Oral Oral   SpO2:       Weight:       Height:         Temp (24hrs), Av.9 °F (36.6 °C), Min:97.8 °F (36.6 °C), Max:98 °F (36.7 °C)      General Appearance:    Alert, cooperative, in no acute distress   Abdomen:    Fundus firm below umbilicus, nontender and benign non-tender, without masses or organomegaly palpable           Extremities: Moves all extremities well, 1+ edema , no cyanosis, no redness.     Labs:   Results from last 7 days   Lab Units 25  0538 25  0537 25  2230   WBC 10*3/mm3 11.33* 12.95* 9.68   HEMOGLOBIN g/dL 8.9* 8.7* 9.7*   HEMATOCRIT % 28.5* 26.3* 30.4*   PLATELETS 10*3/mm3 286 285 309     Results from last 7 days   Lab Units 25  2230   GLUCOSE mg/dL 79         Discharge Medications:     Discharge Medications        New Medications        Instructions Start Date    acetaminophen 500 MG tablet  Commonly known as: TYLENOL   1,000 mg, Oral, Every 6 Hours PRN      ibuprofen 800 MG tablet  Commonly known as: ADVIL,MOTRIN   800 mg, Oral, Every 8 Hours PRN      sennosides-docusate 8.6-50 MG per tablet  Commonly known as: PERICOLACE   1 tablet, Oral, Daily             Continue These Medications        Instructions Start Date   omeprazole 20 MG capsule  Commonly known as: priLOSEC   20 mg, Daily      prenatal vitamin 27-0.8 27-0.8 MG tablet tablet   Daily               Feeding method: Breastfeeding Status: Yes    Blood Type: RH Positive      Plan:  Discharge to home.    Continue oral iron therapy.   Follow-up appointment with  Dr. Tressa Montgomery  in 3 weeks.  All discharge instructions were reviewed with pt including bleeding warnings, s/sx of pp depression, and warning signs in the pp period for which to seek medical attention including but not limited to s/sx of hypertension and thromboembolism.

## 2025-03-25 ENCOUNTER — MATERNAL SCREENING (OUTPATIENT)
Dept: CALL CENTER | Facility: HOSPITAL | Age: 25
End: 2025-03-25
Payer: COMMERCIAL

## 2025-03-25 NOTE — OUTREACH NOTE
Maternal Screening Survey      Flowsheet Row Responses   Eligibility Eligible   Prep survey completed? Yes   Facility patient discharged from? [facility] - Registered Nurse

## 2025-03-27 NOTE — PROGRESS NOTES
"Continued Stay Note  [facility]     Patient Name: Ivis Madrid  MRN: [id number]  Today's Date: 3/27/2025    Admit Date: 3/21/2025    Plan: Infant may discharge to mother when medically ready; CSW will follow cord tox. NANO Srinivasan.   Discharge Plan       Row Name 03/27/25 1358       Plan    Plan Comments Mother: Ivis Madrid, MRN: [id number]; infant: SuhasdasGirl \"Sarah\" Julius, MRN: [id number]. CSW has reviewed infant's cord toxicology results and infant's cords was negative for any substances. Mandated CPS reporting is not required at this time. NANO Srinivasan.                   Discharge Codes    No documentation.                 Expected Discharge Date and Time       Expected Discharge Date Expected Discharge Time    Mar 24, 2025               MALCOLM Quiroz    "

## 2025-04-02 ENCOUNTER — MATERNAL SCREENING (OUTPATIENT)
Dept: CALL CENTER | Facility: HOSPITAL | Age: 25
End: 2025-04-02
Payer: COMMERCIAL

## 2025-04-02 NOTE — OUTREACH NOTE
Maternal Screening Survey      Flowsheet Row Responses   Facility patient discharged from? [facility]   Attempt successful? No   Unsuccessful attempts Attempt 1              Zoraida Gonzalez Registered Nurse

## 2025-04-02 NOTE — OUTREACH NOTE
Maternal Screening Survey      Flowsheet Row Responses   Facility patient discharged from? [facility]   Attempt successful? No   Unsuccessful attempts Attempt 2              Zoraida BAXTER - Registered Nurse

## 2025-04-03 ENCOUNTER — MATERNAL SCREENING (OUTPATIENT)
Dept: CALL CENTER | Facility: HOSPITAL | Age: 25
End: 2025-04-03
Payer: COMMERCIAL

## 2025-04-03 NOTE — OUTREACH NOTE
Maternal Screening Survey      Flowsheet Row Responses   [facility]   [facility] successful? Yes   Call start time 162   Call end time 162   I have been able to laugh and see the funny side of things. 0   I have looked forward with enjoyment to things. 0   I have blamed myself unnecessarily when things went wrong. 0   I have been anxious or worried for no good reason. 0   I have felt scared or panicky for no good reason. 0   Things have been getting on top of me. 0   I have been so unhappy that I have had difficulty sleeping. 0   I have felt sad or miserable. 0   I have been so unhappy that I have been crying. 0   The thought of harming myself has occurred to me. 0   [place]  Depression Scale Total 0   Did any of your parents have problems with alcohol or drug use? No   Do any of your peers have problems with alcohol or drug use? No   Does your partner have problems with alcohol or drug use? No   Before you were pregnant did you have problems with alcohol or drug use? (past) No   In the past month, did you drink beer, wine, liquor or use any other drugs? (pregnancy) No   Maternal Screening call completed Yes   Call end time               Holli MICHAEL - Registered Nurse

## 2025-04-21 ENCOUNTER — POSTPARTUM VISIT (OUTPATIENT)
Dept: OBSTETRICS AND GYNECOLOGY | Age: 25
End: 2025-04-21
Payer: COMMERCIAL

## 2025-04-21 VITALS
WEIGHT: 219 LBS | HEIGHT: 64 IN | SYSTOLIC BLOOD PRESSURE: 124 MMHG | BODY MASS INDEX: 37.39 KG/M2 | DIASTOLIC BLOOD PRESSURE: 72 MMHG

## 2025-04-21 PROCEDURE — 0503F POSTPARTUM CARE VISIT: CPT | Performed by: OBSTETRICS & GYNECOLOGY

## 2025-04-21 NOTE — PROGRESS NOTES
"Chief Complaint   Patient presents with    Postpartum Follow-up     5 week PP Check, vaginal delivery, baby girl, \"Sarah\", bottle feeding, posterior midline second-degree laceration, small right sidewall laceration, last pap 2024 (-), interested in IUD      Ivis Madrid is doing well, 5 wk PP From . Formula feeding, no breast issues. Mood has been good, no s/sx PP depression. Perineum healing well. She desires mirena for contraception    /72   Ht 162.6 cm (64\")   Wt 99.3 kg (219 lb)   LMP 2024 (Exact Date)   Breastfeeding No   BMI 37.59 kg/m²   Well, no distress  Regular, nonlabored breathing    A/P  Postpartum follow up    Mirena in 2-3 weeks, will do pelvic then  Pap utd  Overall doing well    Tressa Montgomery MD  2025  14:46 EDT    "

## 2025-05-16 ENCOUNTER — TELEPHONE (OUTPATIENT)
Dept: OBSTETRICS AND GYNECOLOGY | Age: 25
End: 2025-05-16
Payer: COMMERCIAL

## 2025-05-16 NOTE — TELEPHONE ENCOUNTER
IUD benefits    Patient called checking on mirena IUD status. Patient were to follow up from 4/21/25 visit. I do not see any form in media tab on mirena.

## 2025-05-22 ENCOUNTER — OFFICE VISIT (OUTPATIENT)
Dept: OBSTETRICS AND GYNECOLOGY | Age: 25
End: 2025-05-22
Payer: COMMERCIAL

## 2025-05-22 VITALS — BODY MASS INDEX: 37.73 KG/M2 | WEIGHT: 221 LBS | HEIGHT: 64 IN

## 2025-05-22 DIAGNOSIS — Z30.430 ENCOUNTER FOR IUD INSERTION: Primary | ICD-10-CM

## 2025-05-22 PROBLEM — Z34.90 SUPERVISION OF NORMAL PREGNANCY: Status: RESOLVED | Noted: 2024-08-07 | Resolved: 2025-05-22

## 2025-05-22 PROBLEM — O43.199 MARGINAL INSERTION OF UMBILICAL CORD AFFECTING MANAGEMENT OF MOTHER: Status: RESOLVED | Noted: 2025-01-29 | Resolved: 2025-05-22

## 2025-05-22 PROBLEM — O99.019 MATERNAL ANEMIA IN PREGNANCY, ANTEPARTUM: Status: RESOLVED | Noted: 2025-03-05 | Resolved: 2025-05-22

## 2025-05-22 PROBLEM — O99.820 GBS (GROUP B STREPTOCOCCUS CARRIER), +RV CULTURE, CURRENTLY PREGNANT: Status: RESOLVED | Noted: 2025-03-05 | Resolved: 2025-05-22

## 2025-05-22 NOTE — PROGRESS NOTES
Procedure note    5/22/2025    Patient: Ivis Madrid          MR#:[id number]      IUD Insertion Procedure Note    Pre-operative Diagnosis: Desires long acting reversible contraception     Post-operative Diagnosis: Same    Indications: contraception    Procedure Details   Urine pregnancy test was done and was NEGATIVE .  The risks (including infection, bleeding, pain, and uterine perforation) and benefits of the procedure were explained to the patient and Written informed consent was obtained.      A timeout was performed and confirmed correct patient, procedure, IUD    Cervix cleansed with Betadine. The anterior lip of the cervix was grasped with a single tooth tenaculum. Uterus sounded to 9 cm. IUD inserted without difficulty. The tenaculum was removed and sites were  hemostatic after using silver nitrate. String visible and trimmed. Patient tolerated procedure well.    IUD Information:  Mirena, Lot # [id number], Expiration date may 2027.    Estimated Blood Loss: Minimal    Specimens: None    Condition:  Stable    Complications:  None    Plan:    The patient was advised to call for any fever or for prolonged or severe pain or bleeding. She was advised to use OTC ibuprofen as needed for mild to moderate pain.     Attending Physician Documentation:  I was present for the entire procedure.        Tressa Montgomery MD  5/22/2025 12:00 EDT

## (undated) DEVICE — LN SMPL CO2 SHTRM SD STREAM W/M LUER

## (undated) DEVICE — STPCK 3WY D201 DISCOFIX

## (undated) DEVICE — DRP C/ARM 41X74IN

## (undated) DEVICE — ENDOPATH PNEUMONEEDLE INSUFFLATION NEEDLES WITH LUER LOCK CONNECTORS 120MM: Brand: ENDOPATH

## (undated) DEVICE — ENDOPATH XCEL BLADELESS TROCARS WITH STABILITY SLEEVES: Brand: ENDOPATH XCEL

## (undated) DEVICE — ADAPT CLN BIOGUARD AIR/H2O DISP

## (undated) DEVICE — MSK PROC CURAPLEX O2 2/ADAPT 7FT

## (undated) DEVICE — BITEBLOCK OMNI BLOC

## (undated) DEVICE — ENDOPATH XCEL UNIVERSAL TROCAR STABLILITY SLEEVES: Brand: ENDOPATH XCEL

## (undated) DEVICE — SUT VIC 0 TN 27IN DYED JTN0G

## (undated) DEVICE — KT ORCA ORCAPOD DISP STRL

## (undated) DEVICE — SUT MNCRYL PLS ANTIB UD 4/0 PS2 18IN

## (undated) DEVICE — SOL NACL 0.9PCT 1000ML

## (undated) DEVICE — APPL CHLORAPREP HI/LITE 26ML ORNG

## (undated) DEVICE — LAPAROSCOPIC SMOKE FILTRATION SYSTEM: Brand: PALL LAPAROSHIELD® PLUS LAPAROSCOPIC SMOKE FILTRATION SYSTEM

## (undated) DEVICE — LOU LAP CHOLE: Brand: MEDLINE INDUSTRIES, INC.

## (undated) DEVICE — GLV SURG PREMIERPRO ORTHO LTX PF SZ7.5 BRN

## (undated) DEVICE — CATH CHOLANG 4.5F18IN BRGNDY

## (undated) DEVICE — TUBING, SUCTION, 1/4" X 10', STRAIGHT: Brand: MEDLINE

## (undated) DEVICE — DEV LK WIREGUIDE FUSN OLYMP SCP

## (undated) DEVICE — ERBE NESSY®PLATE 170 SPLIT; 168CM²; CABLE 3M: Brand: ERBE

## (undated) DEVICE — SPHINCTEROTOME: Brand: HYDRATOME RX 44

## (undated) DEVICE — CONTAINER,SPECIMEN,OR STERILE,4OZ: Brand: MEDLINE

## (undated) DEVICE — ENDOCUT SCISSOR TIP, DISPOSABLE: Brand: RENEW

## (undated) DEVICE — CATH IV INSYTE AUTOGARD 14G 1 1/2IN ORNG

## (undated) DEVICE — DRAPE,REIN 53X77,STERILE: Brand: MEDLINE

## (undated) DEVICE — 3M™ STERI-STRIP™ COMPOUND BENZOIN TINCTURE 40 BAGS/CARTON 4 CARTONS/CASE C1544: Brand: 3M™ STERI-STRIP™

## (undated) DEVICE — RETRIEVAL BALLOON CATHETER: Brand: EXTRACTOR™ PRO RX

## (undated) DEVICE — EXTENSION SET, MALE LUER LOCK ADAPTER WITH RETRACTABLE COLLAR

## (undated) DEVICE — ENDOPOUCH RETRIEVER SPECIMEN RETRIEVAL BAGS: Brand: ENDOPOUCH RETRIEVER

## (undated) DEVICE — SENSR O2 OXIMAX FNGR A/ 18IN NONSTR